# Patient Record
Sex: FEMALE | Race: WHITE | NOT HISPANIC OR LATINO | Employment: STUDENT | URBAN - METROPOLITAN AREA
[De-identification: names, ages, dates, MRNs, and addresses within clinical notes are randomized per-mention and may not be internally consistent; named-entity substitution may affect disease eponyms.]

---
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
## 2023-10-13 NOTE — PROGRESS NOTES
New Knee      Patient: Albertina Sinclair        YOB: 2005    Medical Record Number: [id number]        Chief Complaints: Left knee pain      History of Present Illness: This is a 18-year-old female who is status post ACL reconstruction in 2021 she did have a partial medial lateral meniscectomy she states she had some periodic pain since that time but 2 weeks ago she was kneeling down her knee popped and she has had pain since that time.  She has been seeing physical therapy on and off for several months she currently working at [employer].  She is scheduled to run a half marathon in the next 3 weeks but I do not think she is going to be able to do that.  Past medical history is unremarkable        Allergies: No Known Allergies    Medications:   Home Medications:  Current Outpatient Medications on File Prior to Visit   Medication Sig    doxycycline (ADOXA) 100 MG tablet Take 1 tablet by mouth.    Erenumab-aooe (AIMOVIG) 70 MG/ML auto-injector Inject 1 mL under the skin into the appropriate area as directed.    metroNIDAZOLE (FLAGYL) 500 MG tablet Take 1 tablet by mouth.    naproxen (NAPROSYN) 500 MG tablet Last took one week ago    SUMAtriptan (IMITREX) 25 MG tablet Take 1 tablet by mouth.    topiramate (TOPAMAX) 50 MG tablet Take 50 mg by mouth Daily. (Patient not taking: Reported on 10/16/2023)     No current facility-administered medications on file prior to visit.     Current Medications:  Scheduled Meds:  Continuous Infusions:No current facility-administered medications for this visit.    PRN Meds:.    Past Medical History:   Diagnosis Date    Anxiety     Body piercing     new nose piercing.. can't remove     COVID-19 virus infection 2020    Left knee injury     playing soccer     Migraines         Past Surgical History:   Procedure Laterality Date    KNEE ACL RECONSTRUCTION Left 12/21/2021    Procedure: LEFT KNEE ARTHROSCOPY, PARTIAL MEDIAL AND LATERAL MENISCECTOMY, ANTERIOR CRUCIATE LIGAMENT RECONSTRUCTION  "WITH AUTOGRAFT  w mcq  12/21;  Surgeon: Neelam Ying MD;  [facility] [facility] [place] [facility] [place];  Service: Orthopedics;  Laterality: Left;    LEG SURGERY      age 3        Social History     Occupational History    Not on file   Tobacco Use    Smoking status: Never    Smokeless tobacco: Never   Vaping Use    Vaping Use: Never used   Substance and Sexual Activity    Alcohol use: Never    Drug use: Never    Sexual activity: Defer      Social History     Social History Narrative    Not on file        Family History   Problem Relation Age of Onset    No Known Problems Mother     Hypertension Father     Malig Hyperthermia Neg Hx              Review of Systems:     Review of Systems      Physical Exam: 18 y.o. female  General Appearance:    Alert, cooperative, in no acute distress                   Vitals:    10/16/23 1422   Temp: 96.8 °F (36 °C)   Weight: 72.3 kg (159 lb 4.8 oz)   Height: 149.9 cm (59\")   PainSc:   3      Patient is alert and read ×3 no acute distress appears her above-listed at height weight and age.  Affect is normal respiratory rate is normal unlabored. Heart rate regular rate rhythm, sclera, dentition and hearing are normal for the purpose of this exam.        Ortho Exam  Exam the left knee she has a healed surgical incision she has a stable diminished exam quads are reasonable she does have a BMI of 32 which puts her in a level of class I obesity  She appears to have a stable ligamentous exam and negative drawer negative Lachman negative pivot shift she is does have some tenderness medially she has pain with bounce home some pain with medial Lashell no pain with lateral Lashell  Procedures             Radiology:   AP, Lateral and merchant views of the left knee  were ordered/reviewed to evauateknee pain.  I have compared to films in 2021 she does have evidence of prior ACL reconstruction she has good maintenance of her joint space  Imaging Results (Most Recent)       Procedure Component Value Units " Date/Time    XR Knee 3 View Left [[id number]] Resulted: 10/16/23 1416     Updated: 10/16/23 1416    Impression:      Ordering physician's impression is located in the Encounter Note dated 10/16/23. X-ray performed in the DR room.            Assessment/Plan:      Left knee pain status post ACL reconstruction.  I be concerned about meniscus tear.  In looking at her previous pictures she did have some fissuring of her lateral femoral condyle that might be bothering her as well thought about an injection but in a 18-year-old I would like to go on to get an MRI before considering that.  She has been doing physical therapy anti-inflammatories ice and rest

## 2023-10-16 ENCOUNTER — OFFICE VISIT (OUTPATIENT)
Dept: ORTHOPEDIC SURGERY | Facility: CLINIC | Age: 18
End: 2023-10-16
Payer: COMMERCIAL

## 2023-10-16 VITALS — BODY MASS INDEX: 32.12 KG/M2 | TEMPERATURE: 96.8 F | HEIGHT: 59 IN | WEIGHT: 159.3 LBS

## 2023-10-16 DIAGNOSIS — R52 PAIN: Primary | ICD-10-CM

## 2023-10-16 DIAGNOSIS — S83.242A TEAR OF MEDIAL MENISCUS OF LEFT KNEE, CURRENT, UNSPECIFIED TEAR TYPE, INITIAL ENCOUNTER: ICD-10-CM

## 2023-10-16 DIAGNOSIS — Z98.890 S/P ACL RECONSTRUCTION: ICD-10-CM

## 2023-10-16 RX ORDER — DOXYCYCLINE 100 MG/1
100 TABLET ORAL
COMMUNITY
Start: 2023-10-16 | End: 2023-10-30

## 2023-10-16 RX ORDER — SUMATRIPTAN 25 MG/1
25 TABLET, FILM COATED ORAL
COMMUNITY
Start: 2023-08-31

## 2023-10-16 RX ORDER — METRONIDAZOLE 500 MG/1
500 TABLET ORAL
COMMUNITY
Start: 2023-10-16 | End: 2023-10-30

## 2023-11-09 ENCOUNTER — HOSPITAL ENCOUNTER (OUTPATIENT)
Dept: MRI IMAGING | Facility: HOSPITAL | Age: 18
Discharge: HOME | End: 2023-11-09
Admitting: ORTHOPAEDIC SURGERY
Payer: COMMERCIAL

## 2023-11-09 DIAGNOSIS — S83.242A TEAR OF MEDIAL MENISCUS OF LEFT KNEE, CURRENT, UNSPECIFIED TEAR TYPE, INITIAL ENCOUNTER: ICD-10-CM

## 2023-11-09 DIAGNOSIS — Z98.890 S/P ACL RECONSTRUCTION: ICD-10-CM

## 2023-11-09 PROCEDURE — 73721 MRI JNT OF LWR EXTRE W/O DYE: CPT

## 2023-11-10 ENCOUNTER — TELEPHONE (OUTPATIENT)
Dept: ORTHOPEDIC SURGERY | Facility: CLINIC | Age: 18
End: 2023-11-10
Payer: COMMERCIAL

## 2023-11-10 NOTE — TELEPHONE ENCOUNTER
----- Message from Neelam Ying MD sent at 11/10/2023 12:28 PM EST -----  Please tell her that she does have a medial meniscus tear that is appears to extend to the surface now her graft looks to be intact if her knee is still bothering her I would recommend scoping this meniscus possible trimming part of it out possible repair I would like her to follow-up for further discussion

## 2023-11-13 ENCOUNTER — OFFICE VISIT (OUTPATIENT)
Dept: ORTHOPEDIC SURGERY | Facility: CLINIC | Age: 18
End: 2023-11-13
Payer: COMMERCIAL

## 2023-11-13 VITALS — WEIGHT: 159.7 LBS | BODY MASS INDEX: 32.2 KG/M2 | HEIGHT: 59 IN | TEMPERATURE: 98 F

## 2023-11-13 DIAGNOSIS — S83.242A OTHER TEAR OF MEDIAL MENISCUS OF LEFT KNEE AS CURRENT INJURY, INITIAL ENCOUNTER: Primary | ICD-10-CM

## 2023-11-13 NOTE — PROGRESS NOTES
Patient: Albertina Sinclair  YOB: 2005  Date of Service: 11/13/2023    Chief Complaints: Left knee pain    Subjective:    History of Present Illness: Pt is seen in the office today with complaints of left knee pain she is here follow-up MRI.  MRI demonstrates a medial meniscus tear.  When she had her ACL done a couple years ago she did have some changes in that meniscus that intraoperatively appeared within the meniscus that did not extend to either side surface.  It does appear to extend to the inferior surface today and that is the only change the graft is intact she states her knee is feeling better        Allergies: No Known Allergies    Medications:   Home Medications:  Current Outpatient Medications on File Prior to Visit   Medication Sig    Erenumab-aooe (AIMOVIG) 70 MG/ML auto-injector Inject 1 mL under the skin into the appropriate area as directed.    naproxen (NAPROSYN) 500 MG tablet Last took one week ago    SUMAtriptan (IMITREX) 25 MG tablet Take 1 tablet by mouth. (Patient not taking: Reported on 11/13/2023)    topiramate (TOPAMAX) 50 MG tablet Take 50 mg by mouth Daily. (Patient not taking: Reported on 10/16/2023)     No current facility-administered medications on file prior to visit.     Current Medications:  Scheduled Meds:  Continuous Infusions:No current facility-administered medications for this visit.    PRN Meds:.    I have reviewed the patient's medical history in detail and updated the computerized patient record.  Review and summarization of old records include:    Past Medical History:   Diagnosis Date    Anxiety     Body piercing     new nose piercing.. can't remove     COVID-19 virus infection 2020    Left knee injury     playing soccer     Migraines         Past Surgical History:   Procedure Laterality Date    KNEE ACL RECONSTRUCTION Left 12/21/2021    Procedure: LEFT KNEE ARTHROSCOPY, PARTIAL MEDIAL AND LATERAL MENISCECTOMY, ANTERIOR CRUCIATE LIGAMENT RECONSTRUCTION WITH  AUTOGRAFT  w mcq  12/21;  Surgeon: Neelam Ying MD;  Location: [facility] [place];  Service: Orthopedics;  Laterality: Left;    LEG SURGERY      age 3        Social History     Occupational History    Not on file   Tobacco Use    Smoking status: Never    Smokeless tobacco: Never   Vaping Use    Vaping Use: Never used   Substance and Sexual Activity    Alcohol use: Never    Drug use: Never    Sexual activity: Defer      Social History     Social History Narrative    Not on file        Family History   Problem Relation Age of Onset    No Known Problems Mother     Hypertension Father     Malig Hyperthermia Neg Hx        ROS: 14 point review of systems was performed and was negative except for documented findings in HPI and today's encounter.     Allergies: No Known Allergies  Constitutional:  Denies fever, shaking or chills   Eyes:  Denies change in visual acuity   HENT:  Denies nasal congestion or sore throat   Respiratory:  Denies cough or shortness of breath   Cardiovascular:  Denies chest pain or severe LE edema   GI:  Denies abdominal pain, nausea, vomiting, bloody stools or diarrhea   Musculoskeletal:  Numbness, tingling, or loss of motor function only as noted above in history of present illness.  : Denies painful urination or hematuria  Integument:  Denies rash, lesion or ulceration   Neurologic:  Denies headache or focal weakness  Endocrine:  Denies lymphadenopathy  Psych:  Denies confusion or change in mental status   Hem:  Denies active bleeding      Physical Exam: 18 y.o. female  Wt Readings from Last 3 Encounters:   11/13/23 72.4 kg (159 lb 11.2 oz)   10/16/23 72.3 kg (159 lb 4.8 oz)   08/11/22 67.1 kg (148 lb) (80%, Z= 0.84)*     * Growth percentiles are based on CDC (Girls, 2-20 Years) data.       Body mass index is 32.26 kg/m².    Vitals:    11/13/23 1655   Temp: 98 °F (36.7 °C)     Vital signs reviewed.   General Appearance:    Alert, cooperative, in no acute distress                    Ortho  exam      Physical exam of the left knee reveals no effusion no redness.  The patient does have tenderness about the medial joint line.  No tenderness about the lateral joint line.  A negative bounce home and a positive medial aLshell.  There is some pain medially  with a lateral Lashell.  Patient has a stable ligamentous exam.  Quads are reasonable and symmetric bilaterally.  Calf is soft and nontender.  There is no overlying skin changes no lymphedema lymphadenopathy.  Patient has good hip range of motion full symmetric and asymptomatic and a normal ankle exam.        MRIs as above have reviewed the films with self and agree with the findings    Assessment: Left knee medial meniscus tear.  It does appear to extend a little more to the surface at her last MRI I had a long discussion with her regarding this.  If her knee continues to bother her would recommend arthroscopy at the time of scope if this is repairable would put sutures in it if not we will do a partial meniscectomy.  She understands the difference in weightbearing and postop regimen she is asking about running etc.  I told her I really limit things like that I would really want her to try to get back in shape before she attempts to to do the same she does have a BMI of 32 however height is listed at 5 9 I would like to get an accurate height and weight when she returns  Plan:   Follow up as indicated.  Ice, elevate, and rest as needed.  Discussed conservative measures of pain control including ice, bracing.  Also talked about the importance of strengthening and maintaining ideal body weight    Neelam Ying M.D.

## 2023-11-19 NOTE — PATIENT INSTRUCTIONS
Access Code: [email or web address]  URL: [email or web address]  Date: 12/23/2021  Prepared by: Jeny Marin    Exercises  Long Sitting Quad Set - 3 x daily - 7 x weekly - 1 sets - 20 reps - 5s hold  Seated Table Hamstring Stretch - 3 x daily - 7 x weekly - 1 sets - 3 reps - 20s hold  Sitting Heel Slide with Towel - 3 x daily - 7 x weekly - 3 sets - 10 reps  Seated Heel Slide - 3 x daily - 7 x weekly - 3 sets - 10 reps  Supine Ankle Pumps - 3 x daily - 7 x weekly - 1 sets - 30 reps     50

## 2024-05-22 ENCOUNTER — TELEPHONE (OUTPATIENT)
Dept: ORTHOPEDIC SURGERY | Facility: CLINIC | Age: 19
End: 2024-05-22
Payer: COMMERCIAL

## 2024-05-22 NOTE — TELEPHONE ENCOUNTER
Lets offer her to see Cristhian Thursday or Friday so she can get in sooner she most likely needs a repeat MRI I last saw her in November

## 2024-05-22 NOTE — PROGRESS NOTES
[facility]  New Problem      Patient Name: Albertina Sinclair  : 2005  Primary Care Physician: Fausto Moore MD        Chief Complaint:  Left knee pain    HPI:   Albertina Sinclair is a 18 y.o. year old who presents today for evaluation. Patient has a history of Acl reconstruction with Dr. Ying 2021. She did well post-operatively but had recurrent knee pain about 7 months ago. MRI was obtained which was concerning for worsening medial meniscus tear- posterior horn which extended more to the surface. She was improving so non-op treatment was recommended as long as meniscus didn't get worse. She did well until a new recent event about 3 weeks ago.    She was sitting on the ground, cross legged. When she did so she experienced a painful pop, her knee swelled up. Since then has been swollen and hard to bend or straighten. Limping when she is walking. It is locking and catching, she has to sometimes forcefully maneuver the knee to get it to unlock.    Pain right in the middle, behind knee cap, some anterior lateral pain as well. Has some night pain with turning in bed etc.     She has been using advil, ice, resting and bracing all without improvement in her symptoms and she is pretty miserable as far as her pain.      Past Medical/Surgical, Social and Family History:  I have reviewed and/or updated pertinent history as noted in the medical record including:  Past Medical History:   Diagnosis Date    Anxiety     Body piercing     new nose piercing.. can't remove     COVID-19 virus infection     Left knee injury     playing soccer     Migraines     Tear of meniscus of knee      Past Surgical History:   Procedure Laterality Date    KNEE ACL RECONSTRUCTION Left 2021    Procedure: LEFT KNEE ARTHROSCOPY, PARTIAL MEDIAL AND LATERAL MENISCECTOMY, ANTERIOR CRUCIATE LIGAMENT RECONSTRUCTION WITH AUTOGRAFT  w [facility] [place]  ;  Surgeon: Neelam Ying MD;  Location: [facility] OR [facility] [place];  Service:  Orthopedics;  Laterality: Left;    LEG SURGERY      age 3     Social History     Occupational History    Not on file   Tobacco Use    Smoking status: Never    Smokeless tobacco: Never   Vaping Use    Vaping status: Never Used   Substance and Sexual Activity    Alcohol use: Never    Drug use: Never    Sexual activity: Not Currently     Partners: Male     Birth control/protection: Condom          Allergies: No Known Allergies    Medications:   Home Medications:  Current Outpatient Medications on File Prior to Visit   Medication Sig    Erenumab-aooe (AIMOVIG) 70 MG/ML auto-injector Inject 1 mL under the skin into the appropriate area as directed.    SUMAtriptan (IMITREX) 25 MG tablet Take 1 tablet by mouth.    [DISCONTINUED] naproxen (NAPROSYN) 500 MG tablet Last took one week ago    [DISCONTINUED] topiramate (TOPAMAX) 50 MG tablet Take 50 mg by mouth Daily. (Patient not taking: Reported on 10/16/2023)     No current facility-administered medications on file prior to visit.         ROS:  14 point review of systems was negative except as listed in the HPI.    Physical Exam:   18 y.o. female  Body mass index is 29.33 kg/m²., 65.9 kg (145 lb 3.2 oz)  Vitals:    05/23/24 0935   Temp: 98.2 °F (36.8 °C)     General: Alert, cooperative, appears well and in no observable distress. Appears stated age and BMI as listed above.  HEENT: Normocephalic, atraumatic on external visual inspection.  CV: No significant peripheral edema.  Respiratory: Normal respiratory effort.  Skin: Warm & well perfused; appropriate skin turgor.  Psych: Appropriate mood & affect.  Neuro: Gross sensation and motor intact in affected extremity/extremities.  Vascular: Peripheral pulses palpable in affected extremity/extremities.     MSK Exam:    Knee Exam:    Left   No deformity or wounds appreciated. No significant redness or warmth.  1+ effusion noted.  Tenderness along the joint line appreciated medially.  ROM 5-120 with pain at terminal  motion.  Ligamentous exam grossly stable.  Lashell + medial  Bounce Home +  Lachman painful but appears stable.  Negative anterior and posterior drawer  Stable to varus/valgus  Quad strength 4 to 4+/5    Right   No deformity or wounds appreciated. No significant redness or warmth.  No significant effusion noted.  No significant tenderness appreciated about the joint.  ROM 0-130 and painless.  Ligamentous exam grossly stable.  Quad strength 4+ to 5/5.    Brief hip exam in the affected extremity(ies) grossly unremarkable.  Moves ankle and toes up and down, no significant pain or swelling in the foot, ankle or calf.       Radiology:    The following X-rays were ordered/reviewed today to evaluate the patient's symptoms: Single Knee: AP standing and sunrise views of both knees, and lateral view of painful knee show stable implements from prior ACL reconstruction- in expected position. Her joint spaces are well preserved medial and lateral compartments as well as the patellofemoral joint space. No obvious lesions, no OCD noted. Stable when compared with prior films.    Procedure:   N/A    Misc. Data/Labs: N/A    Assessment & Plan:    ICD-10-CM ICD-9-CM   1. Other tear of medial meniscus of left knee as current injury, initial encounter  S83.242A 836.0   2. History of repair of ACL  Z98.890 V45.89     No orders of the defined types were placed in this encounter.    Orders Placed This Encounter   Procedures    XR Knee 3 View Left    MRI Knee Left Without Contrast     This is a 17 y/o female with a new left knee injury. She has known meniscal pathology which she and Dr. Ying have been closely following. She was doing well until recently when she had a painful pop in her left knee while sitting in deep flexion and she has been in pain since despite conservative care. Concern is that there has been further derangement of her meniscus and she will need to consider arthroscopy. Plan is to proceed with an MRI to better  evaluate for surgical planning. Certainly she cannot live with the mechanical symptoms- locking/catching etc.     Recommend she continue with conservative care, brace, ice etc in the interim. She will see Dr. Ying next week, we will try to get this MRI done within the next few days.     Return for follow up after the MRI.    Patient encouraged to call with questions or concerns prior to follow up.  Recommend ICE and/or HEAT PRN as discussed.  Will discuss with attending physician as needed.  Consider additional referrals, work up and/or advanced imaging as indicated or if patient fails to respond to conservative care.        Cristhian Pereira, APRN

## 2024-05-22 NOTE — TELEPHONE ENCOUNTER
Caller: DAT BYRNES    Relationship to patient: Mother    Patient is needing: PATIENTS MOTHER CALLED STATING THE PATIENT RECENTLY HEARD A POP IN HER LEFT KNEE AND IS NOW EXPERIENCING SWELLING AND IS HAVING A HARD TIME WALKING.   PATIENTS MOTHER WANTED TO GET THE PATIENT SEEN SOONER THAN 5/31/24, BUT THERE WAS NOTHING SOONER THAN 5/30/24.     PATIENT WORKS DURING OUR BUSINESS HOURS, PLEASE MESSAGE PATIENT VIA [employer] TO DISCUSS WHAT TO DO FOR THE LEFT KNEE PAIN.     PATIENTS MOTHER IS NOT ON A VERBAL RELEASE

## 2024-05-23 ENCOUNTER — OFFICE VISIT (OUTPATIENT)
Dept: ORTHOPEDIC SURGERY | Facility: CLINIC | Age: 19
End: 2024-05-23
Payer: COMMERCIAL

## 2024-05-23 VITALS — HEIGHT: 59 IN | WEIGHT: 145.2 LBS | TEMPERATURE: 98.2 F | BODY MASS INDEX: 29.27 KG/M2

## 2024-05-23 DIAGNOSIS — S83.242A OTHER TEAR OF MEDIAL MENISCUS OF LEFT KNEE AS CURRENT INJURY, INITIAL ENCOUNTER: Primary | ICD-10-CM

## 2024-05-23 DIAGNOSIS — Z98.890 HISTORY OF REPAIR OF ACL: ICD-10-CM

## 2024-05-24 ENCOUNTER — TELEPHONE (OUTPATIENT)
Dept: ORTHOPEDIC SURGERY | Facility: CLINIC | Age: 19
End: 2024-05-24

## 2024-05-24 NOTE — TELEPHONE ENCOUNTER
Caller: Albertina Sinclair     Relationship: Self     Best call back number: [phone]     What was the call regarding: PT STATES THAT [facility] HAS NOT RECEIVED MRI REFERRAL YET. PT STATES THAT SOMEONE LET HER KNOW THAT THE ORDER WAS FAXED TO [facility] ON 05/23/24. PLEASE CONTACT PT TO CONFIRM THAT THE MRI WAS FAXED. PT IS CONCERNED THAT IF SHE DOES NOT HAVE HER MRI SOON, SHE WILL NEED TO RESCHEDULE HER FOLLOW UP ON 05/31/24.

## 2024-05-28 ENCOUNTER — TELEPHONE (OUTPATIENT)
Dept: ORTHOPEDIC SURGERY | Facility: CLINIC | Age: 19
End: 2024-05-28

## 2024-05-28 NOTE — TELEPHONE ENCOUNTER
Caller: Albertina Sinclair    Relationship: Self    Best call back number: [phone]    Who are you requesting to speak with (clinical staff, provider,  specific staff member): MARK HERNANDEZ    What was the call regarding: PT STATES THAT MARK TOLD HER THAT HER MRI REFERRAL WOULD BE MARKED AS URGENT, BUT IT WAS NOT. SINCE THE REFERRAL WAS NOT MARKED AS URGENT, [facility] STATES THEY CANNOT GET THE PT IN FOR HER MRI THIS WEEK. PT CURRENTLY HAS AN APPT WITH DR LA IN REGARDS TO HER L KNEE ON 05/31/24. PT CURRENTLY HAS MRI SCHEDULED FOR 06/06/24. PT WOULD BE OK WITH RESCHEDULING MRI FOR THIS WEEK IF POSSIBLE. PLEASE CONTACT PT IF APPT WITH DR LA ON 05/31/24 NEEDS TO BE RESCHEDULED FOR AFTER HER MRI OR IF MRI ORDER CAN BE MARKED URGENT FOR HER TO GET HER MRI DONE SOONER.    Anticipated Discharge Disposition: TBD     Action: LSW made tc to pt's son, Dr. Lopze Shaikh ([phone]). This number has been disconnected.      LSW made tc to ([phone]) and this worker was provided a number to [facility] ([phone]) this # is for billing only. This worker was provided with [facility]'s office # at [facility] ([phone]) and [facility]'s office # at [facility] ([phone]).     LSW made a tc to [facility]'s office # at [facility] ([phone]) and  for a return call.     LSW made a tc to [facility]'s office # at [facility] ([phone]) and  for a return call.      LSW located ([phone]) in pt's chart for sonLopez. LSW called the # and was unable to leave a VM.     LSW made tc to ([phone]), another # located in pt's chart for sonLopez, the number has been disconnected.      LSW made tc to ([phone]), number located in pt's chart for daughter, Senia Shaikh. No answer, just rang non-stop.     LSW made tc to ([phone]) [facility] search listed for pt's dtr, Senia. The man who answered stated it was the wrong number.      Barriers to Discharge: Safe dc Plan; No Decision Maker     Plan: LSW to f/u with management.     Lopez Shaikh - Son  Doctor with [facility] in [place]        Alondra Hawayfn Shoemaker - Daughter  [phone]  [email or web address]  [address] [place] [address]      Hesham Shoemaker - 2nd degree   [phone]  [email or web address]  [address] [place] [address]

## 2024-05-28 NOTE — TELEPHONE ENCOUNTER
I did ask that they try to get it done within the week although not technically a STAT. It may be partly due to delay with insurance approval. We can try to move it up sooner, but 6/6/24 is probably not unreasonable if she could wait. If [facility] is willing able to move it up that would be great. If not, we might just move her appointment with Dr. Ying out a day or two after the MRI.

## 2024-05-29 ENCOUNTER — TELEPHONE (OUTPATIENT)
Dept: ORTHOPEDIC SURGERY | Facility: CLINIC | Age: 19
End: 2024-05-29

## 2024-05-29 NOTE — TELEPHONE ENCOUNTER
Caller: Albertina Sinclair    Relationship to patient: Self    Best call back number: [phone] (home)     Patient is needing: PATIENT WANTS TO KNOW IF HER SHE CAN DO HER 5/31/24 VISIT AS TELEHEALTH

## 2024-05-30 NOTE — TELEPHONE ENCOUNTER
Do we have the MRI report if it is not an operative discussion then we can do a telehealth if it is an operative discussion then I like to do those face-to-face.  I do not see the MRI report in the chart

## 2024-05-31 ENCOUNTER — OFFICE VISIT (OUTPATIENT)
Dept: ORTHOPEDIC SURGERY | Facility: CLINIC | Age: 19
End: 2024-05-31
Payer: COMMERCIAL

## 2024-05-31 VITALS — BODY MASS INDEX: 29.23 KG/M2 | HEIGHT: 59 IN | TEMPERATURE: 98.4 F | WEIGHT: 145 LBS

## 2024-05-31 DIAGNOSIS — S83.242D OTHER TEAR OF MEDIAL MENISCUS OF LEFT KNEE AS CURRENT INJURY, SUBSEQUENT ENCOUNTER: Primary | ICD-10-CM

## 2024-05-31 NOTE — PROGRESS NOTES
I spoke to patient on the phone to give her the results of the MRI.  She has missed a lot of work recently and asked if we would do it over the phone.  She has been through knee surgery before as we did her ACL.  Her MRI shows a medial meniscus tear as well as an intact ACL graft.  At some point we need need to scope this if her symptoms persist I think she is done everything conservative she probably needs to wait till the end of the summer per the patient so her rotations are over.  We did discuss perioperative regimen we discussed risk benefits and alternatives she understands these again she has been through the surgery and the

## 2024-05-31 NOTE — PROGRESS NOTES
Patient: Albertina Sinclair  YOB: 2005  Date of Service: 5/31/2024    Chief Complaints:     Subjective:    History of Present Illness: Pt is seen in the office today with complaints of         Allergies: No Known Allergies    Medications:   Home Medications:  Current Outpatient Medications on File Prior to Visit   Medication Sig    Erenumab-aooe (AIMOVIG) 70 MG/ML auto-injector Inject 1 mL under the skin into the appropriate area as directed.    SUMAtriptan (IMITREX) 25 MG tablet Take 1 tablet by mouth.     No current facility-administered medications on file prior to visit.     Current Medications:  Scheduled Meds:  Continuous Infusions:No current facility-administered medications for this visit.    PRN Meds:.    I have reviewed the patient's medical history in detail and updated the computerized patient record.  Review and summarization of old records include:    Past Medical History:   Diagnosis Date    Anxiety     Body piercing     new nose piercing.. can't remove     COVID-19 virus infection 2020    Left knee injury     playing soccer     Migraines     Tear of meniscus of knee         Past Surgical History:   Procedure Laterality Date    KNEE ACL RECONSTRUCTION Left 12/21/2021    Procedure: LEFT KNEE ARTHROSCOPY, PARTIAL MEDIAL AND LATERAL MENISCECTOMY, ANTERIOR CRUCIATE LIGAMENT RECONSTRUCTION WITH AUTOGRAFT  w q  12/21;  Surgeon: Neelam Ying MD;  [place] [facility] [place] [facility] [place]: [place]opedics;  Laterality: Left;    LEG SURGERY      age 3        Social History     Occupational History    Not on file   Tobacco Use    Smoking status: Never    Smokeless tobacco: Never   Vaping Use    Vaping status: Never Used   Substance and Sexual Activity    Alcohol use: Never    Drug use: Never    Sexual activity: Not Currently     Partners: Male     Birth control/protection: Condom      Social History     Social History Narrative    Not on file        Family History   Problem Relation Age of Onset    No  Known Problems Mother     Hypertension Father     Malig Hyperthermia Neg Hx        ROS: 14 point review of systems was performed and was negative except for documented findings in HPI and today's encounter.     Allergies: No Known Allergies  Constitutional:  Denies fever, shaking or chills   Eyes:  Denies change in visual acuity   HENT:  Denies nasal congestion or sore throat   Respiratory:  Denies cough or shortness of breath   Cardiovascular:  Denies chest pain or severe LE edema   GI:  Denies abdominal pain, nausea, vomiting, bloody stools or diarrhea   Musculoskeletal:  Numbness, tingling, or loss of motor function only as noted above in history of present illness.  : Denies painful urination or hematuria  Integument:  Denies rash, lesion or ulceration   Neurologic:  Denies headache or focal weakness  Endocrine:  Denies lymphadenopathy  Psych:  Denies confusion or change in mental status   Hem:  Denies active bleeding      Physical Exam: 18 y.o. female  Wt Readings from Last 3 Encounters:   05/23/24 65.9 kg (145 lb 3.2 oz)   11/13/23 72.4 kg (159 lb 11.2 oz)   10/16/23 72.3 kg (159 lb 4.8 oz)     *** HELP TEXT ***    This SmartLink requires parameters. Parameters are variables that are added to the SmartLink name to request specific information. The parameter for .lastht is the number of encounters to display readings from.    For example: .lastht[4    In this example, the SmartLink displays readings from the last four encounters.    There is no height or weight on file to calculate BMI.    There were no vitals filed for this visit.  Vital signs reviewed.   General Appearance:    Alert, cooperative, in no acute distress                    Ortho exam                 Assessment:     Plan:   Follow up as indicated.  Ice, elevate, and rest as needed.  Discussed conservative measures of pain control including ice, bracing.  Also talked about the importance of strengthening and maintaining ideal body  clive Ying M.D.

## 2024-07-24 ENCOUNTER — PREP FOR SURGERY (OUTPATIENT)
Dept: OTHER | Facility: HOSPITAL | Age: 19
End: 2024-07-24
Payer: COMMERCIAL

## 2024-07-24 DIAGNOSIS — S83.242D OTHER TEAR OF MEDIAL MENISCUS OF LEFT KNEE AS CURRENT INJURY, SUBSEQUENT ENCOUNTER: Primary | ICD-10-CM

## 2024-07-25 PROBLEM — S83.242A TEAR OF MEDIAL MENISCUS OF LEFT KNEE, CURRENT: Status: ACTIVE | Noted: 2024-07-24

## 2024-08-12 ENCOUNTER — TELEPHONE (OUTPATIENT)
Dept: ORTHOPEDIC SURGERY | Facility: CLINIC | Age: 19
End: 2024-08-12

## 2024-08-12 NOTE — TELEPHONE ENCOUNTER
Caller: PANCHO FITZ    Phone Number: [phone] (home)     Reason for Call:   PATIENT CALLED IN NEEDING TO KNOW MORE INFORMATION ABOUT SURGERY . WHEN TO BE THERE ANY PREP THINGS SHE NEEDS TO DO ETCETERA.

## 2024-08-13 NOTE — TELEPHONE ENCOUNTER
I spoke to patient this morning. She was contacted by [facility] yesterday and found out surgery may not be covered by insurance now and may need to wait for approx three weeks. Patient will call back today and let me know of final decision.

## 2024-10-20 ENCOUNTER — PATIENT MESSAGE (OUTPATIENT)
Dept: ORTHOPEDIC SURGERY | Facility: CLINIC | Age: 19
End: 2024-10-20
Payer: COMMERCIAL

## 2024-10-20 NOTE — LETTER
October 21, 2024     Patient: Albertina Sinclair   YOB: 2005   Date of Visit: 10/20/2024       To Whom It May Concern:    It is my medical opinion that Albertina Sinclair will need to do remote learning for at least 2 weeks after her knee surgery to allow her to recover.           Sincerely,        Neelam Ying MD    CC: No Recipients

## 2024-10-21 ENCOUNTER — OFFICE VISIT (OUTPATIENT)
Dept: FAMILY MEDICINE CLINIC | Facility: CLINIC | Age: 19
End: 2024-10-21
Payer: COMMERCIAL

## 2024-10-21 ENCOUNTER — LAB (OUTPATIENT)
Dept: LAB | Facility: HOSPITAL | Age: 19
End: 2024-10-21
Payer: COMMERCIAL

## 2024-10-21 VITALS
DIASTOLIC BLOOD PRESSURE: 78 MMHG | WEIGHT: 148 LBS | BODY MASS INDEX: 29.84 KG/M2 | SYSTOLIC BLOOD PRESSURE: 116 MMHG | OXYGEN SATURATION: 99 % | HEIGHT: 59 IN | HEART RATE: 90 BPM

## 2024-10-21 DIAGNOSIS — F41.9 ANXIETY: ICD-10-CM

## 2024-10-21 DIAGNOSIS — Z00.00 HEALTHCARE MAINTENANCE: ICD-10-CM

## 2024-10-21 DIAGNOSIS — Z11.59 ENCOUNTER FOR HEPATITIS C SCREENING TEST FOR LOW RISK PATIENT: ICD-10-CM

## 2024-10-21 DIAGNOSIS — L70.9 ACNE, UNSPECIFIED ACNE TYPE: ICD-10-CM

## 2024-10-21 DIAGNOSIS — Z11.4 SCREENING FOR HIV WITHOUT PRESENCE OF RISK FACTORS: ICD-10-CM

## 2024-10-21 DIAGNOSIS — L65.9 HAIR LOSS: ICD-10-CM

## 2024-10-21 DIAGNOSIS — Z00.00 HEALTHCARE MAINTENANCE: Primary | ICD-10-CM

## 2024-10-21 DIAGNOSIS — F81.0: ICD-10-CM

## 2024-10-21 DIAGNOSIS — Z23 NEED FOR TDAP VACCINATION: ICD-10-CM

## 2024-10-21 LAB
BASOPHILS # BLD AUTO: 0.03 10*3/MM3 (ref 0–0.2)
BASOPHILS NFR BLD AUTO: 0.4 % (ref 0–1.5)
CHOLEST SERPL-MCNC: 169 MG/DL (ref 0–200)
DEPRECATED RDW RBC AUTO: 40.1 FL (ref 37–54)
EOSINOPHIL # BLD AUTO: 0.13 10*3/MM3 (ref 0–0.4)
EOSINOPHIL NFR BLD AUTO: 1.8 % (ref 0.3–6.2)
ERYTHROCYTE [DISTWIDTH] IN BLOOD BY AUTOMATED COUNT: 11.8 % (ref 12.3–15.4)
HBA1C MFR BLD: 5.1 % (ref 4.8–5.6)
HCT VFR BLD AUTO: 41.3 % (ref 34–46.6)
HCV AB SER QL: NORMAL
HDLC SERPL QL: 3.52
HDLC SERPL-MCNC: 48 MG/DL (ref 40–60)
HGB BLD-MCNC: 14 G/DL (ref 12–15.9)
HIV 1+2 AB+HIV1 P24 AG SERPL QL IA: NORMAL
IMM GRANULOCYTES # BLD AUTO: 0.02 10*3/MM3 (ref 0–0.05)
IMM GRANULOCYTES NFR BLD AUTO: 0.3 % (ref 0–0.5)
LDLC SERPL CALC-MCNC: 111 MG/DL (ref 0–100)
LYMPHOCYTES # BLD AUTO: 2.33 10*3/MM3 (ref 0.7–3.1)
LYMPHOCYTES NFR BLD AUTO: 32.7 % (ref 19.6–45.3)
MCH RBC QN AUTO: 31.3 PG (ref 26.6–33)
MCHC RBC AUTO-ENTMCNC: 33.9 G/DL (ref 31.5–35.7)
MCV RBC AUTO: 92.4 FL (ref 79–97)
MONOCYTES # BLD AUTO: 0.53 10*3/MM3 (ref 0.1–0.9)
MONOCYTES NFR BLD AUTO: 7.4 % (ref 5–12)
NEUTROPHILS NFR BLD AUTO: 4.08 10*3/MM3 (ref 1.7–7)
NEUTROPHILS NFR BLD AUTO: 57.4 % (ref 42.7–76)
NRBC BLD AUTO-RTO: 0 /100 WBC (ref 0–0.2)
PLATELET # BLD AUTO: 465 10*3/MM3 (ref 140–450)
PMV BLD AUTO: 9.1 FL (ref 6–12)
RBC # BLD AUTO: 4.47 10*6/MM3 (ref 3.77–5.28)
TRIGL SERPL-MCNC: 52 MG/DL (ref 0–150)
VLDLC SERPL-MCNC: 10 MG/DL (ref 5–40)
WBC NRBC COR # BLD AUTO: 7.12 10*3/MM3 (ref 3.4–10.8)

## 2024-10-21 PROCEDURE — 86803 HEPATITIS C AB TEST: CPT

## 2024-10-21 PROCEDURE — 80061 LIPID PANEL: CPT

## 2024-10-21 PROCEDURE — G0432 EIA HIV-1/HIV-2 SCREEN: HCPCS

## 2024-10-21 PROCEDURE — 90471 IMMUNIZATION ADMIN: CPT

## 2024-10-21 PROCEDURE — 99385 PREV VISIT NEW AGE 18-39: CPT

## 2024-10-21 PROCEDURE — 36415 COLL VENOUS BLD VENIPUNCTURE: CPT

## 2024-10-21 PROCEDURE — 2014F MENTAL STATUS ASSESS: CPT

## 2024-10-21 PROCEDURE — 1159F MED LIST DOCD IN RCRD: CPT

## 2024-10-21 PROCEDURE — 83036 HEMOGLOBIN GLYCOSYLATED A1C: CPT

## 2024-10-21 PROCEDURE — 85025 COMPLETE CBC W/AUTO DIFF WBC: CPT

## 2024-10-21 PROCEDURE — 1160F RVW MEDS BY RX/DR IN RCRD: CPT

## 2024-10-21 PROCEDURE — 80053 COMPREHEN METABOLIC PANEL: CPT

## 2024-10-21 PROCEDURE — 84443 ASSAY THYROID STIM HORMONE: CPT

## 2024-10-21 PROCEDURE — 1126F AMNT PAIN NOTED NONE PRSNT: CPT

## 2024-10-21 PROCEDURE — 90715 TDAP VACCINE 7 YRS/> IM: CPT

## 2024-10-21 NOTE — ASSESSMENT & PLAN NOTE
- Discussed that it could be related to cyclic hormonal changes.   - Encouraged to discuss with gynecology  - Can discuss specific treatment (doxycycline or spironolactone) at next visit if symptoms are still present.

## 2024-10-21 NOTE — ASSESSMENT & PLAN NOTE
- Referral sent to behavioral health regarding formal testing for this.  - Informed patient that once a formal diagnosis is reached, can provide school accomodations.

## 2024-10-21 NOTE — PROGRESS NOTES
"    Female Physical Note      Date: 10/21/2024   Patient Name: Albertina Sinclair  : 2005   MRN: [id number]     Chief Complaint:    Chief Complaint   Patient presents with   • Establish Care     Pt. States she would like to have a referral for Learning Disability.        History of Present Illness: Albertina Sinclair is a 19 y.o. female who is here today for their annual health maintenance and physical.    HPI  Albertina grew up in [place], [place]. 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     Concerns for reading disability-  She states she has always wondered about whether she has had a reading disability. She states she was a good student with good grades, but admits she always struggled with reading and therefore avoided it. She continues to take a \"long time\" to read things. She now studies engineering, and states that she can do math without concern. However, she has recently noticed that she struggles to evaluate large tables with large amounts of data. She has trouble focusing on individual numbers in charts like these, and notices she will mix up numbers. Particularly struggles with 2's and 5's. This recently affected her performance on two tests. Her sister has a learning disability with accomodations at school.     Anxiety- She was diagnosed with this as a child. She recall trails of some medications for this, but did not continue long-term as she did not like the way they made her feel. She felt \"like a zombie.\" She reports this is well-managed now and she goes to therapy regularly.     Acne/hirsutism/hair loss- She was evaluated for these symptoms with pediatrician several years ago with concerns for thyroid dysfunction or PCOS. She states that all testing returned normal at that time. She was on OCP therapy at the time and stopped taking in 2023. She has not seen change in symptoms since initial workup. She adds that now she deals with weight fluctuations. She has regular " menstrual cycles, periods last 5-7 days without accompanying symptoms.       Subjective      Review of Systems:   Review of Systems   Constitutional:  Negative for chills, fatigue and fever.   HENT:  Negative for congestion.    Respiratory:  Negative for shortness of breath.    Cardiovascular:  Negative for chest pain and leg swelling.   Gastrointestinal:  Negative for abdominal pain, constipation, diarrhea and nausea.   Genitourinary:  Negative for difficulty urinating and menstrual problem.   Musculoskeletal:  Negative for arthralgias and back pain.   Skin:  Negative for rash.        Facial acne   Neurological:  Negative for dizziness and headaches.        Past Medical History, Social History, Family History and Care Team were all reviewed with patient and updated as appropriate.     Medications:   No current outpatient medications on file.    Allergies:   No Known Allergies    Immunizations:  Td/Tdap(Booster Q 10 yrs):  7/10/2014- due  Flu (Yearly):  Due- unable to provide in clinic today, recommended to seek at pharmacy.  Pneumonia: UTD  Immunization History   Administered Date(s) Administered   • 31-influenza Vac Quardvalent Preservativ 12/28/2015   • COVID-19 (PFIZER) Purple Cap Monovalent 07/09/2021, 07/30/2021   • DTaP, Unspecified 2005, 2005, 2005, 12/20/2004, 08/20/2007   • HPV Quadrivalent 07/10/2014   • Hep A, Unspecified 08/26/2009, 07/12/2010   • Hep B, Adolescent or Pediatric 2005, 2005, 2003, 12/20/2004   • HiB 2005, 2005, 12/20/2004   • Hpv9 07/23/2019   • MMR 07/13/2004, 08/20/2007   • Meningococcal Conjugate 07/10/2014   • Meningococcal MCV4P (Menactra) 07/23/2019   • PEDS-Pneumococcal Conjugate (PCV7) 2005, 2005, 2003, 10/11/2004   • Pneumococcal, Unspecified 2005, 2005, 2003, 10/11/2004   • Polio, Unspecified 2005, 2003, 03/22/2004, 08/20/2007   • Tdap 07/10/2014, 10/21/2024   • Trumenba(meningococcal  "B) 08/09/2021   • Varicella 07/13/2004, 08/20/2007       Pap:  Has a gynecologist, will complete pap smear with them.   Last Completed Pap Smear       This patient has no relevant Health Maintenance data.           Hep C (Age 18-79 once):  Ordered today   HIV (Age 15-65 once): Ordered today   A1c: Ordered today   Lipid panel: Ordered today   The ASCVD Risk score (Liane CANALES, et al., 2019) failed to calculate for the following reasons:    The 2019 ASCVD risk score is only valid for ages 40 to 79    Ophthalmologist: Has one   Dentist: Has one    Tobacco Use: Low Risk  (10/21/2024)    Patient History    • Smoking Tobacco Use: Never    • Smokeless Tobacco Use: Never    • Passive Exposure: Not on file       Social History     Substance and Sexual Activity   Alcohol Use Never        Social History     Substance and Sexual Activity   Drug Use Never        Diet/Physical activity: Normally states she is physically active, but has been unable to participate in sports and physical activity 2/2 knee injury.     Sexual Health: Uses condoms for contraception, not attempting pregnancy   Menstrual Cycles: regular, lasting 5-7 days, tracking with help of an nakul.     PHQ-2 Depression Screening  Little interest or pleasure in doing things? Not at all   Feeling down, depressed, or hopeless? Not at all   PHQ-2 Total Score 0     PHQ-9 Total Score:      Intimate partner violence: Declines experiencing intimate partner violence.     Objective     Physical Exam:  Vital Signs:   Vitals:    10/21/24 0942   BP: 116/78   Pulse: 90   SpO2: 99%   Weight: 67.1 kg (148 lb)   Height: 149.9 cm (59.02\")   PainSc: 0-No pain     Body mass index is 29.88 kg/m².     Physical Exam  Vitals reviewed.   Constitutional:       General: She is not in acute distress.     Appearance: Normal appearance.   HENT:      Head: Normocephalic and atraumatic.      Right Ear: Tympanic membrane normal.      Left Ear: Tympanic membrane normal.      Nose: Nose normal. No " congestion.      Mouth/Throat:      Mouth: Mucous membranes are moist.      Pharynx: Oropharynx is clear.   Eyes:      Pupils: Pupils are equal, round, and reactive to light.   Cardiovascular:      Rate and Rhythm: Normal rate and regular rhythm.      Pulses: Normal pulses.      Heart sounds: No murmur heard.  Pulmonary:      Effort: Pulmonary effort is normal. No respiratory distress.      Breath sounds: Normal breath sounds.   Abdominal:      General: Abdomen is flat. Bowel sounds are normal.      Palpations: Abdomen is soft.   Musculoskeletal:      Cervical back: Normal range of motion.      Right lower leg: No edema.      Left lower leg: No edema.   Lymphadenopathy:      Cervical: No cervical adenopathy.   Skin:     General: Skin is warm and dry.      Capillary Refill: Capillary refill takes less than 2 seconds.      Findings: No rash.   Neurological:      General: No focal deficit present.      Mental Status: She is alert and oriented to person, place, and time.      Cranial Nerves: No cranial nerve deficit.      Gait: Gait normal.      Deep Tendon Reflexes: Reflexes normal.   Psychiatric:         Mood and Affect: Mood normal.         Thought Content: Thought content normal.         Procedures    Assessment / Plan      Assessment/Plan:   Diagnoses and all orders for this visit:    1. Healthcare maintenance (Primary)  -     Comprehensive Metabolic Panel; Future  -     CBC & Differential; Future  -     Lipid Panel With / Chol / HDL Ratio; Future  -     TSH Rfx On Abnormal To Free T4; Future  -     Hemoglobin A1c; Future  -     HIV-1/O/2 Ag/Ab w Reflex; Future  -     Hepatitis C antibody; Future    2. Specific reading difficulty  Assessment & Plan:  - Referral sent to behavioral health regarding formal testing for this.  - Informed patient that once a formal diagnosis is reached, can provide school accomodations.       3. Hair loss  Assessment & Plan:  - TSH today    Orders:  -     CBC & Differential; Future  -      TSH Rfx On Abnormal To Free T4; Future    4. Acne, unspecified acne type  Assessment & Plan:  - Discussed that it could be related to cyclic hormonal changes.   - Encouraged to discuss with gynecology  - Can discuss specific treatment (doxycycline or spironolactone) at next visit if symptoms are still present.       5. Anxiety  Assessment & Plan:  - Per patient, well-managed  - Continue regular therapy      6. Screening for HIV without presence of risk factors  -     HIV-1/O/2 Ag/Ab w Reflex; Future    7. Encounter for hepatitis C screening test for low risk patient  -     Hepatitis C antibody; Future    8. Need for Tdap vaccination  -     Tdap Vaccine => 8yo IM (BOOSTRIX/ADACEL)    Other orders  -     Cancel: Fluzone >6mos ([id number])         Healthcare Maintenance:  Counseling provided based on age appropriate USPSTF guidelines. Preventive counseling and anticipatory guidance discussed on the following topics: nutrition, physical activity, family planning/contraception, sexual behavior and STDs, dental health, mental health, and immunizations         Albertina Sinclair voices understanding and acceptance of this advice and will call back with any further questions or concerns. AVS with preventive healthcare tips printed for patient.     “Discussed risks/benefits to vaccination, reviewed components of the vaccine, discussed VIS, discussed informed consent, informed consent obtained. Patient/Parent was allowed to accept or refuse vaccine. Questions answered to satisfactory state of patient/Parent. We reviewed typical age appropriate and seasonally appropriate vaccinations. Reviewed immunization history and updated state vaccination form as needed. Patient was counseled on Tdap    Follow Up:   Return in about 4 months (around 2/21/2025) for Next scheduled follow up.        Hayley Horvath PA-C

## 2024-10-21 NOTE — LETTER
[facility]  Vaccine Consent Form    Patient Name:  Albertina Sinclair  Patient :  2005     Vaccine(s) Ordered    Tdap Vaccine => 6yo IM (BOOSTRIX/ADACEL)        Screening Checklist  The following questions should be completed prior to vaccination. If you answer “yes” to any question, it does not necessarily mean you should not be vaccinated. It just means we may need to clarify or ask more questions. If a question is unclear, please ask your healthcare provider to explain it.    Yes No   Any fever or moderate to severe illness today (mild illness and/or antibiotic treatment are not contraindications)?     Do you have a history of a serious reaction to any previous vaccinations, such as anaphylaxis, encephalopathy within 7 days, Guillain-Lakewood syndrome within 6 weeks, seizure?     Have you received any live vaccine(s) (e.g MMR, QUYEN) or any other vaccines in the last month (to ensure duplicate doses aren't given)?     Do you have an anaphylactic allergy to latex (DTaP, DTaP-IPV, Hep A, Hep B, MenB, RV, Td, Tdap), baker’s yeast (Hep B, HPV), polysorbates (RSV, nirsevimab, PCV 20, Rotavirrus, Tdap, Shingrix), or gelatin (QUYEN, MMR)?     Do you have an anaphylactic allergy to neomycin (Rabies, QUYEN, MMR, IPV, Hep A), polymyxin B (IPV), or streptomycin (IPV)?      Any cancer, leukemia, AIDS, or other immune system disorder? (QUYEN, MMR, RV)     Do you have a parent, brother, or sister with an immune system problem (if immune competence of vaccine recipient clinically verified, can proceed)? (MMR, QUYEN)     Any recent steroid treatments for >2 weeks, chemotherapy, or radiation treatment? (QUYEN, MMR)     Have you received antibody-containing blood transfusions or IVIG in the past 11 months (recommended interval is dependent on product)? (MMR, QUYEN)     Have you taken antiviral drugs (acyclovir, famciclovir, valacyclovir for QUYEN) in the last 24 or 48 hours, respectively?      Are you pregnant or planning to become pregnant  "within 1 month? (QUYEN, MMR, HPV, IPV, MenB, Abrexvy; For Hep B- refer to Engerix-B; For RSV - Abrysvo is indicated for 32-36 weeks of pregnancy from September to January)     For infants, have you ever been told your child has had intussusception or a medical emergency involving obstruction of the intestine (Rotavirus)? If not for an infant, can skip this question.         *Ordering Physicians/APC should be consulted if \"yes\" is checked by the patient or guardian above.  I have received, read, and understand the Vaccine Information Statement (VIS) for each vaccine ordered.  I have considered my or my child's health status as well as the health status of my close contacts.  I have taken the opportunity to discuss my vaccine questions with my or my child's health care provider.   I have requested that the ordered vaccine(s) be given to me or my child.  I understand the benefits and risks of the vaccines.  I understand that I should remain in the clinic for 15 minutes after receiving the vaccine(s).  _________________________________________________________  Signature of Patient or Parent/Legal Guardian ____________________  Date     "

## 2024-10-22 LAB
ALBUMIN SERPL-MCNC: 4.7 G/DL (ref 3.5–5.2)
ALBUMIN/GLOB SERPL: 1.6 G/DL
ALP SERPL-CCNC: 57 U/L (ref 39–117)
ALT SERPL W P-5'-P-CCNC: 9 U/L (ref 1–33)
ANION GAP SERPL CALCULATED.3IONS-SCNC: 12.7 MMOL/L (ref 5–15)
AST SERPL-CCNC: 15 U/L (ref 1–32)
BILIRUB SERPL-MCNC: 0.4 MG/DL (ref 0–1.2)
BUN SERPL-MCNC: 9 MG/DL (ref 6–20)
BUN/CREAT SERPL: 10.5 (ref 7–25)
CALCIUM SPEC-SCNC: 10.1 MG/DL (ref 8.6–10.5)
CHLORIDE SERPL-SCNC: 105 MMOL/L (ref 98–107)
CO2 SERPL-SCNC: 24.3 MMOL/L (ref 22–29)
CREAT SERPL-MCNC: 0.86 MG/DL (ref 0.57–1)
EGFRCR SERPLBLD CKD-EPI 2021: 98.7 ML/MIN/1.73
GLOBULIN UR ELPH-MCNC: 2.9 GM/DL
GLUCOSE SERPL-MCNC: 83 MG/DL (ref 65–99)
POTASSIUM SERPL-SCNC: 4.6 MMOL/L (ref 3.5–5.2)
PROT SERPL-MCNC: 7.6 G/DL (ref 6–8.5)
SODIUM SERPL-SCNC: 142 MMOL/L (ref 136–145)
TSH SERPL DL<=0.05 MIU/L-ACNC: 0.71 UIU/ML (ref 0.27–4.2)

## 2024-10-24 ENCOUNTER — PATIENT ROUNDING (BHMG ONLY) (OUTPATIENT)
Dept: FAMILY MEDICINE CLINIC | Facility: CLINIC | Age: 19
End: 2024-10-24
Payer: COMMERCIAL

## 2024-10-24 ENCOUNTER — PATIENT MESSAGE (OUTPATIENT)
Dept: FAMILY MEDICINE CLINIC | Facility: CLINIC | Age: 19
End: 2024-10-24
Payer: COMMERCIAL

## 2024-10-25 DIAGNOSIS — F81.0: Primary | ICD-10-CM

## 2024-10-29 ENCOUNTER — HOSPITAL ENCOUNTER (OUTPATIENT)
Facility: HOSPITAL | Age: 19
Setting detail: HOSPITAL OUTPATIENT SURGERY
Discharge: HOME | End: 2024-10-29
Attending: ORTHOPAEDIC SURGERY | Admitting: ORTHOPAEDIC SURGERY
Payer: COMMERCIAL

## 2024-10-29 ENCOUNTER — ANESTHESIA (OUTPATIENT)
Dept: PERIOP | Facility: HOSPITAL | Age: 19
End: 2024-10-29
Payer: COMMERCIAL

## 2024-10-29 ENCOUNTER — ANESTHESIA EVENT (OUTPATIENT)
Dept: PERIOP | Facility: HOSPITAL | Age: 19
End: 2024-10-29
Payer: COMMERCIAL

## 2024-10-29 VITALS
HEIGHT: 59 IN | RESPIRATION RATE: 16 BRPM | TEMPERATURE: 98 F | WEIGHT: 147.93 LBS | BODY MASS INDEX: 29.82 KG/M2 | HEART RATE: 64 BPM | SYSTOLIC BLOOD PRESSURE: 110 MMHG | OXYGEN SATURATION: 97 % | DIASTOLIC BLOOD PRESSURE: 74 MMHG

## 2024-10-29 DIAGNOSIS — S83.242D OTHER TEAR OF MEDIAL MENISCUS OF LEFT KNEE AS CURRENT INJURY, SUBSEQUENT ENCOUNTER: ICD-10-CM

## 2024-10-29 LAB
B-HCG UR QL: NEGATIVE
EXPIRATION DATE: NORMAL
INTERNAL NEGATIVE CONTROL: NORMAL
INTERNAL POSITIVE CONTROL: NORMAL
Lab: NORMAL

## 2024-10-29 PROCEDURE — 29880 ARTHRS KNE SRG MNISECTMY M&L: CPT | Performed by: ORTHOPAEDIC SURGERY

## 2024-10-29 PROCEDURE — 25010000002 HYDROMORPHONE PER 4 MG: Performed by: NURSE ANESTHETIST, CERTIFIED REGISTERED

## 2024-10-29 PROCEDURE — 25010000002 FENTANYL CITRATE (PF) 50 MCG/ML SOLUTION: Performed by: NURSE ANESTHETIST, CERTIFIED REGISTERED

## 2024-10-29 PROCEDURE — 25010000002 ONDANSETRON PER 1 MG: Performed by: NURSE ANESTHETIST, CERTIFIED REGISTERED

## 2024-10-29 PROCEDURE — 25010000002 DEXAMETHASONE SODIUM PHOSPHATE 20 MG/5ML SOLUTION: Performed by: NURSE ANESTHETIST, CERTIFIED REGISTERED

## 2024-10-29 PROCEDURE — 81025 URINE PREGNANCY TEST: CPT | Performed by: ANESTHESIOLOGY

## 2024-10-29 PROCEDURE — 25810000003 LACTATED RINGERS PER 1000 ML: Performed by: ANESTHESIOLOGY

## 2024-10-29 PROCEDURE — 25010000002 KETOROLAC TROMETHAMINE PER 15 MG: Performed by: NURSE ANESTHETIST, CERTIFIED REGISTERED

## 2024-10-29 PROCEDURE — 25010000002 PROPOFOL 10 MG/ML EMULSION: Performed by: NURSE ANESTHETIST, CERTIFIED REGISTERED

## 2024-10-29 PROCEDURE — 25010000002 LIDOCAINE 2% SOLUTION: Performed by: NURSE ANESTHETIST, CERTIFIED REGISTERED

## 2024-10-29 RX ORDER — LIDOCAINE HYDROCHLORIDE 10 MG/ML
0.5 INJECTION, SOLUTION INFILTRATION; PERINEURAL ONCE AS NEEDED
Status: DISCONTINUED | OUTPATIENT
Start: 2024-10-29 | End: 2024-10-29 | Stop reason: HOSPADM

## 2024-10-29 RX ORDER — FENTANYL CITRATE 50 UG/ML
50 INJECTION, SOLUTION INTRAMUSCULAR; INTRAVENOUS
Status: DISCONTINUED | OUTPATIENT
Start: 2024-10-29 | End: 2024-10-29 | Stop reason: HOSPADM

## 2024-10-29 RX ORDER — SODIUM CHLORIDE 0.9 % (FLUSH) 0.9 %
3-10 SYRINGE (ML) INJECTION AS NEEDED
Status: DISCONTINUED | OUTPATIENT
Start: 2024-10-29 | End: 2024-10-29 | Stop reason: HOSPADM

## 2024-10-29 RX ORDER — NALOXONE HCL 0.4 MG/ML
0.2 VIAL (ML) INJECTION AS NEEDED
Status: DISCONTINUED | OUTPATIENT
Start: 2024-10-29 | End: 2024-10-29 | Stop reason: HOSPADM

## 2024-10-29 RX ORDER — PROMETHAZINE HYDROCHLORIDE 25 MG/1
25 TABLET ORAL ONCE AS NEEDED
Status: DISCONTINUED | OUTPATIENT
Start: 2024-10-29 | End: 2024-10-29 | Stop reason: HOSPADM

## 2024-10-29 RX ORDER — BUPIVACAINE HYDROCHLORIDE AND EPINEPHRINE 2.5; 5 MG/ML; UG/ML
INJECTION, SOLUTION INFILTRATION; PERINEURAL AS NEEDED
Status: DISCONTINUED | OUTPATIENT
Start: 2024-10-29 | End: 2024-10-29 | Stop reason: HOSPADM

## 2024-10-29 RX ORDER — EPHEDRINE SULFATE 50 MG/ML
5 INJECTION, SOLUTION INTRAVENOUS ONCE AS NEEDED
Status: DISCONTINUED | OUTPATIENT
Start: 2024-10-29 | End: 2024-10-29 | Stop reason: HOSPADM

## 2024-10-29 RX ORDER — FENTANYL CITRATE 50 UG/ML
INJECTION, SOLUTION INTRAMUSCULAR; INTRAVENOUS AS NEEDED
Status: DISCONTINUED | OUTPATIENT
Start: 2024-10-29 | End: 2024-10-29 | Stop reason: SURG

## 2024-10-29 RX ORDER — ONDANSETRON 2 MG/ML
4 INJECTION INTRAMUSCULAR; INTRAVENOUS ONCE AS NEEDED
Status: DISCONTINUED | OUTPATIENT
Start: 2024-10-29 | End: 2024-10-29 | Stop reason: HOSPADM

## 2024-10-29 RX ORDER — FENTANYL CITRATE 50 UG/ML
50 INJECTION, SOLUTION INTRAMUSCULAR; INTRAVENOUS ONCE AS NEEDED
Status: DISCONTINUED | OUTPATIENT
Start: 2024-10-29 | End: 2024-10-29 | Stop reason: HOSPADM

## 2024-10-29 RX ORDER — IPRATROPIUM BROMIDE AND ALBUTEROL SULFATE 2.5; .5 MG/3ML; MG/3ML
3 SOLUTION RESPIRATORY (INHALATION) ONCE AS NEEDED
Status: DISCONTINUED | OUTPATIENT
Start: 2024-10-29 | End: 2024-10-29 | Stop reason: HOSPADM

## 2024-10-29 RX ORDER — KETOROLAC TROMETHAMINE 30 MG/ML
INJECTION, SOLUTION INTRAMUSCULAR; INTRAVENOUS AS NEEDED
Status: DISCONTINUED | OUTPATIENT
Start: 2024-10-29 | End: 2024-10-29 | Stop reason: SURG

## 2024-10-29 RX ORDER — SODIUM CHLORIDE, SODIUM LACTATE, POTASSIUM CHLORIDE, AND CALCIUM CHLORIDE .6; .31; .03; .02 G/100ML; G/100ML; G/100ML; G/100ML
IRRIGANT IRRIGATION AS NEEDED
Status: DISCONTINUED | OUTPATIENT
Start: 2024-10-29 | End: 2024-10-29 | Stop reason: HOSPADM

## 2024-10-29 RX ORDER — SODIUM CHLORIDE 0.9 % (FLUSH) 0.9 %
3 SYRINGE (ML) INJECTION EVERY 12 HOURS SCHEDULED
Status: DISCONTINUED | OUTPATIENT
Start: 2024-10-29 | End: 2024-10-29 | Stop reason: HOSPADM

## 2024-10-29 RX ORDER — DEXAMETHASONE SODIUM PHOSPHATE 4 MG/ML
INJECTION, SOLUTION INTRA-ARTICULAR; INTRALESIONAL; INTRAMUSCULAR; INTRAVENOUS; SOFT TISSUE AS NEEDED
Status: DISCONTINUED | OUTPATIENT
Start: 2024-10-29 | End: 2024-10-29 | Stop reason: SURG

## 2024-10-29 RX ORDER — DIPHENHYDRAMINE HYDROCHLORIDE 50 MG/ML
12.5 INJECTION INTRAMUSCULAR; INTRAVENOUS
Status: DISCONTINUED | OUTPATIENT
Start: 2024-10-29 | End: 2024-10-29 | Stop reason: HOSPADM

## 2024-10-29 RX ORDER — DROPERIDOL 2.5 MG/ML
0.62 INJECTION, SOLUTION INTRAMUSCULAR; INTRAVENOUS
Status: DISCONTINUED | OUTPATIENT
Start: 2024-10-29 | End: 2024-10-29 | Stop reason: HOSPADM

## 2024-10-29 RX ORDER — HYDROCODONE BITARTRATE AND ACETAMINOPHEN 5; 325 MG/1; MG/1
1 TABLET ORAL ONCE AS NEEDED
Status: COMPLETED | OUTPATIENT
Start: 2024-10-29 | End: 2024-10-29

## 2024-10-29 RX ORDER — LABETALOL HYDROCHLORIDE 5 MG/ML
5 INJECTION, SOLUTION INTRAVENOUS
Status: DISCONTINUED | OUTPATIENT
Start: 2024-10-29 | End: 2024-10-29 | Stop reason: HOSPADM

## 2024-10-29 RX ORDER — PROPOFOL 10 MG/ML
VIAL (ML) INTRAVENOUS AS NEEDED
Status: DISCONTINUED | OUTPATIENT
Start: 2024-10-29 | End: 2024-10-29 | Stop reason: SURG

## 2024-10-29 RX ORDER — LIDOCAINE HYDROCHLORIDE 20 MG/ML
INJECTION, SOLUTION INFILTRATION; PERINEURAL AS NEEDED
Status: DISCONTINUED | OUTPATIENT
Start: 2024-10-29 | End: 2024-10-29 | Stop reason: SURG

## 2024-10-29 RX ORDER — ONDANSETRON 2 MG/ML
INJECTION INTRAMUSCULAR; INTRAVENOUS AS NEEDED
Status: DISCONTINUED | OUTPATIENT
Start: 2024-10-29 | End: 2024-10-29 | Stop reason: SURG

## 2024-10-29 RX ORDER — FLUMAZENIL 0.1 MG/ML
0.2 INJECTION INTRAVENOUS AS NEEDED
Status: DISCONTINUED | OUTPATIENT
Start: 2024-10-29 | End: 2024-10-29 | Stop reason: HOSPADM

## 2024-10-29 RX ORDER — SODIUM CHLORIDE, SODIUM LACTATE, POTASSIUM CHLORIDE, CALCIUM CHLORIDE 600; 310; 30; 20 MG/100ML; MG/100ML; MG/100ML; MG/100ML
9 INJECTION, SOLUTION INTRAVENOUS CONTINUOUS
Status: DISCONTINUED | OUTPATIENT
Start: 2024-10-29 | End: 2024-10-29 | Stop reason: HOSPADM

## 2024-10-29 RX ORDER — OXYCODONE AND ACETAMINOPHEN 7.5; 325 MG/1; MG/1
1 TABLET ORAL EVERY 4 HOURS PRN
Status: DISCONTINUED | OUTPATIENT
Start: 2024-10-29 | End: 2024-10-29 | Stop reason: HOSPADM

## 2024-10-29 RX ORDER — HYDROCODONE BITARTRATE AND ACETAMINOPHEN 5; 325 MG/1; MG/1
1 TABLET ORAL EVERY 4 HOURS PRN
Qty: 12 TABLET | Refills: 0 | Status: SHIPPED | OUTPATIENT
Start: 2024-10-29

## 2024-10-29 RX ORDER — HYDROMORPHONE HYDROCHLORIDE 1 MG/ML
0.5 INJECTION, SOLUTION INTRAMUSCULAR; INTRAVENOUS; SUBCUTANEOUS
Status: DISCONTINUED | OUTPATIENT
Start: 2024-10-29 | End: 2024-10-29 | Stop reason: HOSPADM

## 2024-10-29 RX ORDER — FAMOTIDINE 10 MG/ML
20 INJECTION, SOLUTION INTRAVENOUS ONCE
Status: COMPLETED | OUTPATIENT
Start: 2024-10-29 | End: 2024-10-29

## 2024-10-29 RX ORDER — HYDRALAZINE HYDROCHLORIDE 20 MG/ML
5 INJECTION INTRAMUSCULAR; INTRAVENOUS
Status: DISCONTINUED | OUTPATIENT
Start: 2024-10-29 | End: 2024-10-29 | Stop reason: HOSPADM

## 2024-10-29 RX ORDER — MIDAZOLAM HYDROCHLORIDE 1 MG/ML
1 INJECTION, SOLUTION INTRAMUSCULAR; INTRAVENOUS
Status: DISCONTINUED | OUTPATIENT
Start: 2024-10-29 | End: 2024-10-29 | Stop reason: HOSPADM

## 2024-10-29 RX ORDER — PROMETHAZINE HYDROCHLORIDE 25 MG/1
25 SUPPOSITORY RECTAL ONCE AS NEEDED
Status: DISCONTINUED | OUTPATIENT
Start: 2024-10-29 | End: 2024-10-29 | Stop reason: HOSPADM

## 2024-10-29 RX ADMIN — ONDANSETRON 4 MG: 2 INJECTION INTRAMUSCULAR; INTRAVENOUS at 07:43

## 2024-10-29 RX ADMIN — PROPOFOL 25 MCG/KG/MIN: 10 INJECTION, EMULSION INTRAVENOUS at 07:08

## 2024-10-29 RX ADMIN — LIDOCAINE HYDROCHLORIDE 100 MG: 20 INJECTION, SOLUTION INFILTRATION; PERINEURAL at 07:04

## 2024-10-29 RX ADMIN — DEXAMETHASONE SODIUM PHOSPHATE 10 MG: 4 INJECTION, SOLUTION INTRAMUSCULAR; INTRAVENOUS at 07:13

## 2024-10-29 RX ADMIN — FENTANYL CITRATE 50 MCG: 50 INJECTION, SOLUTION INTRAMUSCULAR; INTRAVENOUS at 08:16

## 2024-10-29 RX ADMIN — FAMOTIDINE 20 MG: 10 INJECTION INTRAVENOUS at 06:10

## 2024-10-29 RX ADMIN — FENTANYL CITRATE 50 MCG: 50 INJECTION, SOLUTION INTRAMUSCULAR; INTRAVENOUS at 07:16

## 2024-10-29 RX ADMIN — HYDROCODONE BITARTRATE AND ACETAMINOPHEN 1 TABLET: 5; 325 TABLET ORAL at 08:16

## 2024-10-29 RX ADMIN — KETOROLAC TROMETHAMINE 30 MG: 30 INJECTION, SOLUTION INTRAMUSCULAR at 07:43

## 2024-10-29 RX ADMIN — PROPOFOL 300 MG: 10 INJECTION, EMULSION INTRAVENOUS at 07:04

## 2024-10-29 RX ADMIN — HYDROMORPHONE HYDROCHLORIDE 0.5 MG: 1 INJECTION, SOLUTION INTRAMUSCULAR; INTRAVENOUS; SUBCUTANEOUS at 08:40

## 2024-10-29 RX ADMIN — SODIUM CHLORIDE, POTASSIUM CHLORIDE, SODIUM LACTATE AND CALCIUM CHLORIDE 9 ML/HR: 600; 310; 30; 20 INJECTION, SOLUTION INTRAVENOUS at 06:11

## 2024-10-29 NOTE — ADDENDUM NOTE
Addendum  created 10/29/24 0931 by Iain Delatorre MD    Attestation recorded in Intraprocedure, Intraprocedure Attestations filed

## 2024-10-29 NOTE — H&P
History & Physical       Patient: Albertina Sinclair    Date of Admission: 10/29/2024  5:06 AM    YOB: 2005    Medical Record Number: [id number]    Attending Physician: Neelam Ying MD        Chief Complaints: Other tear of medial meniscus of left knee as current injury, subsequent encounter [S83.242D]      History of Present Illness: This patient is a several month history of left knee pain she is status post ACL reconstruction several years ago she has an exam and an MRI which are consistent with a new medial meniscus tear and she presents for arthroscopy partial medial meniscectomy.  I do not think any of this will be amenable to a repair     Allergies: No Known Allergies    Medications:   Home Medications:  No current facility-administered medications on file prior to encounter.     No current outpatient medications on file prior to encounter.     Current Medications:  Scheduled Meds:ceFAZolin, 2 g, Intravenous, Once  sodium chloride, 3 mL, Intravenous, Q12H      Continuous Infusions:lactated ringers, 9 mL/hr, Last Rate: 9 mL/hr (10/29/24 0611)      PRN Meds:.  fentanyl    lidocaine    midazolam    sodium chloride    Past Medical History:   Diagnosis Date    Anxiety     Body piercing 2021    new nose piercing.. can't remove     COVID-19 virus infection 2020    Left knee injury     playing soccer     Left knee pain     Migraines     Tear of meniscus of knee         Past Surgical History:   Procedure Laterality Date    KNEE ACL RECONSTRUCTION Left 12/21/2021    Procedure: LEFT KNEE ARTHROSCOPY, PARTIAL MEDIAL AND LATERAL MENISCECTOMY, ANTERIOR CRUCIATE LIGAMENT RECONSTRUCTION WITH AUTOGRAFT  w mcq  12/21;  Surgeon: Neelam Ying MD;  Location[facility] [place];  Service: Orthopedics;  Laterality: Left;    LEG SURGERY      age 3        Social History     Occupational History    Not on file   Tobacco Use    Smoking status: Never    Smokeless tobacco: Never   Vaping Use    Vaping status: Never Used    Substance and Sexual Activity    Alcohol use: Never    Drug use: Never    Sexual activity: Not Currently     Partners: Male     Birth control/protection: Condom      Social History     Social History Narrative    Not on file        Family History   Problem Relation Age of Onset    No Known Problems Mother     Hypertension Father     Malig Hyperthermia Neg Hx        Review of Systems      Physical Exam: 19 y.o. female  General Appearance:    Alert, cooperative, in no acute distress                      Vitals:    10/29/24 0552   BP: 106/76   BP Location: Left arm   Patient Position: Sitting   Pulse: 74   Resp: 16   Temp: 97.9 °F (36.6 °C)   TempSrc: Oral   SpO2: 97%        Head:  Normocephalic, without obvious abnormality, atraumatic   Eyes:          Conjunctivae and sclerae normal, no pallor, corneas clear,    Ears:  Ears appear intact with no abnormalities noted   Throat: No oral lesions, no thrush, oral mucosa moist   Neck: No adenopathy, supple, trachea midline, no thyromegaly,    Back:   No kyphosis present, no scoliosis present, no skin lesions,      erythema or scars, no tenderness to percussion or                   palpation,range of motion normal   Lungs:   C respirations regular, even and                 unlabored    Heart:  Regular rhythm and normal rate               Chest Wall:  No abnormalities observed               Pulses: Pulses palpable and equal bilaterally   Skin: No bleeding, bruising or rash   Lymph nodes: No palpable adenopathy   Neurologic: Appears neurologic intact             Assessment:    Tear of medial meniscus of left knee, current          Plan: All risks, benefits and alternatives were discussed.  Risks including but not exclusive to anesthetic complications, including death, MI, CVA, infection, bleeding DVT, PE,  fracture, residual pain and need for future surgery.  Patient understood all and agrees to proceed.

## 2024-10-29 NOTE — ANESTHESIA PROCEDURE NOTES
Airway  Urgency: elective    Date/Time: 10/29/2024 7:06 AM    General Information and Staff    Patient location during procedure: OR  CRNA/CAA: Charmaine Carlson CRNA    Indications and Patient Condition  Indications for airway management: airway protection    Preoxygenated: yes  MILS maintained throughout  Mask difficulty assessment: 1 - vent by mask    Final Airway Details  Final airway type: supraglottic airway      Successful airway: LMA  Size 3     Number of attempts at approach: 1  Assessment: lips, teeth, and gum same as pre-op and atraumatic intubation    Additional Comments  Teeth, tongue, lips, and gums in preop condition. VSS throughout. Easy mask/smooth easy insertion.

## 2024-10-29 NOTE — ANESTHESIA PREPROCEDURE EVALUATION
Anesthesia Evaluation     Patient summary reviewed   no history of anesthetic complications:   NPO Solid Status: > 8 hours  NPO Liquid Status: > 2 hours           Airway   Mallampati: II  TM distance: >3 FB  Neck ROM: full  No difficulty expected  Dental - normal exam     Pulmonary     breath sounds clear to auscultation  (-) shortness of breath, recent URI, not a smokerSleep apnea: STOP BANG 0.  Cardiovascular - negative cardio ROS  Exercise tolerance: excellent (>7 METS)    Rhythm: regular  Rate: normal    (-) past MI, dysrhythmias, angina      Neuro/Psych  (+) headaches (Migraine hx), psychiatric history Anxiety  (-) seizures, CVA  GI/Hepatic/Renal/Endo    (-)  obesityRenal disease: Cr 0.86.    Musculoskeletal     (-) neck stiffness      ROS comment: Prvs L ACL recon, 12/2021  Abdominal    Substance History      OB/GYN  Gestational age approximate: LMP 10/14/2024.        Other        (-) blood dyscrasia                Anesthesia Plan    ASA 2     general     (I have reviewed the patient's history and chart with the patient, including all pertinent laboratory results and imaging. I have explained the risks of anesthesia including but not limited to dental damage, corneal abrasion, nerve injury, MI, stroke, aspiration, and death. Patient has agreed to proceed.  )  intravenous induction     Anesthetic plan, risks, benefits, and alternatives have been provided, discussed and informed consent has been obtained with: patient.    Use of blood products discussed with patient .      CODE STATUS:

## 2024-10-29 NOTE — ANESTHESIA POSTPROCEDURE EVALUATION
Patient: Albertina Sinclair    Procedure Summary       Date: 10/29/24 Room / Location:  [facility] [address] [place] [address] [facility]    Anesthesia Start: 0658 Anesthesia Stop: 0759    Procedure: KNEE ARTHROSCOPY WITH PARTIAL MEDIAL AND LATERAL MENISCETOMY (Left: Knee) Diagnosis:       Other tear of medial meniscus of left knee as current injury, subsequent encounter      (Other tear of medial meniscus of left knee as current injury, subsequent encounter [O42.024N])    Surgeons: Neelam Ying MD Provider: Iain Delatorre MD    Anesthesia Type: general ASA Status: 2            Anesthesia Type: general    Vitals  Vitals Value Taken Time   /66 10/29/24 0908   Temp 36.7 °C (98 °F) 10/29/24 0857   Pulse 64 10/29/24 0917   Resp 16 10/29/24 0857   SpO2 99 % 10/29/24 0917   Vitals shown include unfiled device data.        Post Anesthesia Care and Evaluation    Patient location during evaluation: bedside  Patient participation: complete - patient participated  Level of consciousness: awake and alert  Pain management: adequate    Airway patency: patent  Anesthetic complications: No anesthetic complications  PONV Status: controlled  Cardiovascular status: acceptable and hemodynamically stable  Respiratory status: acceptable, spontaneous ventilation and nonlabored ventilation  Hydration status: acceptable    Comments: /70   Pulse 59   Temp 36.7 °C (98 °F) (Oral)   Resp 16   LMP 10/14/2024 (Approximate)   SpO2 95%

## 2024-10-29 NOTE — OP NOTE
Operative Note      Facility: [facility]  Patient Name: Albertina Sinclair  YOB: 2005  Date: 10/29/2024  Medical Record Number: [id number]      Pre-op Diagnosis:   Other tear of medial meniscus of left knee as current injury, subsequent encounter [S83.242D]    Post-Op Diagnosis Codes:     * Other tear of medial meniscus of left knee as current injury, subsequent encounter [S83.242D]  Lateral meniscus tear  Procedure(s):  Left KNEE ARTHROSCOPY WITH PARTIAL MEDIAL AND LATERAL MENISCETOMY    Surgeon(s):  Neelam Ying MD    Anesthesia: General  Anesthesiologist: Iain Delatorre MD  CRNA: Charmaine Carlson CRNA    Staff:   Circulator: Cristina Donis RN  Scrub Person: Javier Ibrahim    Assistants : none      Estimated Blood Loss: 5 mL    Specimens:    none     Drains: None    Findings: See Dictation    Complications: None      Indication for procedure: This patient has had a several month history of knee pain and has an exam and an MRI which are consistent with meniscal pathology. They understand all options and wish to proceed with arthroscopy.      Description of procedure: The patient was taken to the operating room. They were placed supine on the operating room table. After induction of adequate LMA anesthesia, IV antibiotics the patient underwent exam under anesthesia with symmetric full range of motion. Nonsterile tourniquet was applied patient was placed in the thigh lutz all prominent areas were well padded and end of the table dropped. The leg was prepped and draped in usual sterile fashion. Standard lateral incision was made with 11 blade. Blunt trocar penetrated into the joint, scope followed and the evaluation began.  The patella.  Is a centrally within the trochlear groove cartilage was intact on both then entered the medial compartment under spinal needle localization direct visualization and medial portals established she had an obvious fragmented bucket-handle tear  of the medial meniscus with a fragment that was flipped into the notch and a fragment that was flipped anteriorly.  These were resected with biters baskets motorized lin ultimately Apollo device.  The cartilage was intact the notch was intact the graft was intact and then entered the lateral compartment she had a central tear lateral meniscus more anterior than posterior this was resected with a motorized shaver and the Apollo device she had a small defect in the lateral femoral condyle cartilage felt to be grade 2 on the most lateral aspect this was gently debrided with a motorized shaver            At this point everything was thoroughly irrigated it was suctioned all 3 compartments, the gutters the suprapatellar pouch were all evaluated there was no further acute pathology seen.  Everything was thoroughly irrigated it was injected with Marcaine.  The portals were closed with 3-0 nylon interrupted fashion.  Sterile dressings and Ace wraps were applied.  The patient tolerated the procedure well and was taken to recovery room in good condition.  All sponge and needle counts were correct.                    Date: 10/29/2024  Time: 07:59 EDT

## 2024-11-13 NOTE — PROGRESS NOTES
Knee scope follow-up    Patient: Albertina Sinclair        YOB: 2005    Medical Record Number: [id number]        Chief Complaints: Left knee pain      History of Present Illness: This is a patient is status post left knee arthroscopy she states she is doing great her knee feels much better she is trying to minor restrictions        Allergies: No Known Allergies    Medications:   Home Medications:  Current Outpatient Medications on File Prior to Visit   Medication Sig    HYDROcodone-acetaminophen (NORCO) 5-325 MG per tablet Take 1 tablet by mouth Every 4 (Four) Hours As Needed for Severe Pain.     No current facility-administered medications on file prior to visit.     Current Medications:  Scheduled Meds:  Continuous Infusions:No current facility-administered medications for this visit.    PRN Meds:.    Past Medical History:   Diagnosis Date    Anxiety     Body piercing 2021    new nose piercing.. can't remove     COVID-19 virus infection 2020    Left knee injury     playing soccer     Left knee pain     Migraines     Tear of meniscus of knee         Past Surgical History:   Procedure Laterality Date    KNEE ACL RECONSTRUCTION Left 12/21/2021    Procedure: LEFT KNEE ARTHROSCOPY, PARTIAL MEDIAL AND LATERAL MENISCECTOMY, ANTERIOR CRUCIATE LIGAMENT RECONSTRUCTION WITH AUTOGRAFT  w [facility]  12/21;  Surgeon: Neelam Ying MD;  Location: [facility];  Service: Orthopedics;  Laterality: Left;    KNEE ARTHROSCOPY Left 10/29/2024    Procedure: KNEE ARTHROSCOPY WITH PARTIAL MEDIAL AND LATERAL MENISCETOMY;  Surgeon: Neelam Ying MD;  Location: [facility];  Service: Orthopedics;  Laterality: Left;    LEG SURGERY      age 3        Social History     Occupational History    Not on file   Tobacco Use    Smoking status: Never    Smokeless tobacco: Never   Vaping Use    Vaping status: Never Used   Substance and Sexual Activity    Alcohol use: Never    Drug use: Never    Sexual activity: Not Currently      Partners: Male     Birth control/protection: Condom      Social History     Social History Narrative    Not on file        Family History   Problem Relation Age of Onset    No Known Problems Mother     Hypertension Father     Malig Hyperthermia Neg Hx              Review of Systems:     Review of Systems      Physical Exam: 19 y.o. female  General Appearance:    Alert, cooperative, in no acute distress                 There were no vitals filed for this visit.   Patient is alert and read ×3 no acute distress appears her above-listed at height weight and age.  Affect is normal respiratory rate is normal unlabored. Heart rate regular rate rhythm, sclera, dentition and hearing are normal for the purpose of this exam.        Ortho Exam  Exam of her knee incisions look great sutures were removed Steri-Strips applied she has no effusion she does have quad atrophy she has good range of motion  Procedures                 Assessment/Plan: Status post knee arthroscopy I did review her intraoperative pictures with her we remove her sutures today placed Steri-Strips start of physical therapy and I will see her back in 4 weeks if she is doing well she may call and cancel

## 2024-11-14 ENCOUNTER — OFFICE VISIT (OUTPATIENT)
Dept: ORTHOPEDIC SURGERY | Facility: CLINIC | Age: 19
End: 2024-11-14
Payer: COMMERCIAL

## 2024-11-14 VITALS — WEIGHT: 147.8 LBS | HEIGHT: 59 IN | BODY MASS INDEX: 29.8 KG/M2 | TEMPERATURE: 98.9 F

## 2024-11-14 DIAGNOSIS — Z98.890 S/P ARTHROSCOPY OF KNEE: Primary | ICD-10-CM

## 2025-05-16 ENCOUNTER — TELEMEDICINE (OUTPATIENT)
Age: 20
End: 2025-05-16
Payer: COMMERCIAL

## 2025-05-16 DIAGNOSIS — F41.1 GENERALIZED ANXIETY DISORDER: Primary | ICD-10-CM

## 2025-05-16 NOTE — PROGRESS NOTES
Assessment Consultation Note      Date:2025   Client Name: Albertina Sinclair  : 2005   MRN: [id number]   Time In: 2:45 PM    Time Out: 3:10 PM     Referring Provider:   Patient Care Team:  Hayley Horvath PA-C as PCP - General (Family Medicine)  Fausto Moore MD (Pediatrics)     Chief Complaint:      ICD-10-CM ICD-9-CM   1. Generalized anxiety disorder  F41.1 300.02        This provider is located at [address] [place] [address], [address], [place]. [address]. This visit is being done on behalf of [facility] [address] using a Zoom platform for a video visit in a secure and private environment. The Patient is seen remotely at their home address in [place], using a private computer, phone or tablet.  The patient is able to be seen through a [employer] Visit through [employer] at today's encounter. Patient is being evaluated/treated via telehealth by [employer], and stated they are in a secure environment for this session. The patient's condition being diagnosed/treated is appropriate for telemedicine. The provider identified herself as well as her credentials.   The patient, and/or patient's guardian, consent to being seen remotely, and when consent is given they understand that the consent allows for patient identifiable information to be sent to a third party as needed.   They may refuse to be seen remotely at any time. The electronic data is encrypted and password protected, and the patient and/or guardian has been advised of the potential risks to privacy not withstanding such measures.     You have chosen to receive care through a video visit today. Do you consent to use a video visit for your medical care today? Yes     History of Present Illness:   Albertina Sinclair is a 19 y.o. female ho presents for consultation related to Specific Learning Disorder (SLD) and Attention Deficit/Hyperactivity Disorder. She has been experiencing persistent difficulties with reading and  comprehension since childhood, often requiring multiple readings to grasp the meaning of written words. This issue was brought to her attention by her professor last semester, who suggested the possibility of dyslexia. Her primary care physician concurred and recommended a consultation with a specialist. As an , she recently encountered significant challenges when working with large data tables, frequently misreading numbers and struggling to track the correct spots on the table. This has led to poor academic performance.     Ms. Sinclair was fidgety and significantly anxious throughout session. Ms. Sinclair has a long-standing history of anxiety, dating back to her childhood, characterized by constant worry and stress. She reports feeling overwhelmed and anxious, particularly in social situations, and often experiences physical symptoms such as stomach discomfort. She also reports difficulty sleeping due to her anxiety.     The patient's anxiety symptoms and academic difficulties may also be indicative of ADHD, particularly given the high levels of anxiety she experiences. Further testing is warranted to evaluate for both dyslexia and ADHD.    Past Psychiatric History:   Ms. Sinclair reported that she is engaged in outpatient therapy. She stated that this has been helpful.     Objective     Mental Status Exam:   MENTAL STATUS EXAM   General Appearance:  Cleanly groomed and dressed and well developed  Eye Contact:  Good eye contact  Attitude:  Cooperative, polite and candid  Motor Activity:  Normal gait, posture and fidgety  Muscle Strength:  Normal  Speech:  Normal rate, tone, volume  Language:  Spontaneous  Mood and affect:  Normal, pleasant  Thought Process:  Logical and goal-directed  Associations/ Thought Content:  No delusions  Hallucinations:  None  Suicidal Ideations:  Not present  Homicidal Ideation:  Not present  Sensorium:  Alert and clear  Orientation:  Person, place, time and situation  Immediate Recall,  Recent, and Remote Memory:  Intact  Attention Span/ Concentration:  Good  Fund of Knowledge:  Appropriate for age and educational level  Intellectual Functioning:  Above average  Insight:  Fair  Judgement:  Good  Reliability:  Good  Impulse Control:  Fair     SUICIDE RISK ASSESSMENT/CSSRS:  1. Does client have thoughts of suicide? no  2. Does client have intent for suicide? no  3. Does client have a current plan for suicide? no  4. History of suicide attempts: no  5. Family history of suicide or attempts: no  6. History of violent behaviors towards others or property or thoughts of committing suicide: no  7. History of sexual aggression toward others: no  8. Access to firearms or weapons: no    Subjective     PHQ-9 Depression Screening:   PHQ-9 Total Score: (Patient-Rptd) 6     JOVANNY-7  Feeling nervous, anxious or on edge: (Patient-Rptd) More than half the days  Not being able to stop or control worrying: (Patient-Rptd) More than half the days  Worrying too much about different things: (Patient-Rptd) More than half the days  Trouble Relaxing: (Patient-Rptd) Several days  Being so restless that it is hard to sit still: (Patient-Rptd) Several days  Feeling afraid as if something awful might happen: (Patient-Rptd) Nearly every day  Becoming easily annoyed or irritable: (Patient-Rptd) Nearly every day  JOVANNY 7 Total Score: (Patient-Rptd) 14  If you checked any problems, how difficult have these problems made it for you to do your work, take care of things at home, or get along with other people: (Patient-Rptd) Somewhat difficult    Developmental History:  Ms. Sinclair reported that her mother's birth and pregnancy with her was normal. She also noted that she believes that she met her developmental milestones on time.     Interpersonal/Relational:  Marital Status: single  Ms. Sinclair denied a history of significant romantic relationships.   Support system: two parent,  family, friends, and patient siblings, friends, and  "roommates   Ms. Sinclair reported that she gets along well with her family including her five younger sisters.     Ms. Sinclair reported that she has a few very good friends who she gets along with well. She reported that she struggles to make friends because of her anxiety. She explained that she does not often go out of her way to talk to others. Ms. Sinclair reported that she does well at making \"acquaintances.\" Ms. Sinclair reported that she finds it hard to keep in touch with people and has only ever had around three friends at a time.    Family Psychiatric History:  Ms. Sinclair reported a family history of anxiety, bipolar disorder, and depression.     Educational/Work History:   Highest level of education obtained: Senior at [employer]   Ms. Sinclair reported that she has always done well in school. However, she has had to work harder at reading comprehension. Ms. Sinclair reported that she got along well with her teachers and other students.  Ever been active duty in the ? no  Client's occupation: Undergraduate Researcher  Ms. Sinclair reported that she has a positive job history. She noted that she gets along well with supervisors and coworkers.    Mental/Behavioral Health History:  Past diagnoses: JOVANNY  Are there any significant health issues (current or past): Denied  History of seizures: no    History of Substance Use:  Client History:  Denied  Family History: Ms. Sinclair reported that her father struggles with alcohol use.     Lifestyle:  Current hobbies include: Ms. Sinclair reported that she enjoys going to the gym, biking, watching TV, and working.     Significant Life Events:   Verbal, physical, sexual abuse? no  Has client experienced a death / loss of relationship? no  Has client experienced a major accident or tragic events? no    Legal History:  The patient has no significant history of legal issues. The patient has no current pending legal charges. The patient has no history of significant " violence.    Social History:   Social History     Socioeconomic History    Marital status: Single   Tobacco Use    Smoking status: Never    Smokeless tobacco: Never   Vaping Use    Vaping status: Never Used   Substance and Sexual Activity    Alcohol use: Never    Drug use: Never    Sexual activity: Not Currently     Partners: Male     Birth control/protection: Condom      Past Medical History:   Past Medical History:   Diagnosis Date    Anxiety     Body piercing 2021    new nose piercing.. can't remove     COVID-19 virus infection 2020    Left knee injury     playing soccer     Left knee pain     Migraines     Tear of meniscus of knee      Past Surgical History:   Past Surgical History:   Procedure Laterality Date    KNEE ACL RECONSTRUCTION Left 12/21/2021    Procedure: LEFT KNEE ARTHROSCOPY, PARTIAL MEDIAL AND LATERAL MENISCECTOMY, ANTERIOR CRUCIATE LIGAMENT RECONSTRUCTION WITH AUTOGRAFT  w q  12/21;  Surgeon: Neelam Ying MD;  Location: [facility];  Service: Orthopedics;  Laterality: Left;    KNEE ARTHROSCOPY Left 10/29/2024    Procedure: KNEE ARTHROSCOPY WITH PARTIAL MEDIAL AND LATERAL MENISCETOMY;  Surgeon: Neelam Ying MD;  Location: [facility];  Service: Orthopedics;  Laterality: Left;    LEG SURGERY      age 3     Family History:   Family History   Problem Relation Age of Onset    No Known Problems Mother     Hypertension Father     Malig Hyperthermia Neg Hx      Medications:     Current Outpatient Medications:     HYDROcodone-acetaminophen (NORCO) 5-325 MG per tablet, Take 1 tablet by mouth Every 4 (Four) Hours As Needed for Severe Pain. (Patient not taking: Reported on 11/14/2024), Disp: 12 tablet, Rfl: 0    Allergies:   No Known Allergies    Quality Measures:     TOBACCO USE:  Tobacco Use: Low Risk  (5/19/2025)    Patient History     Smoking Tobacco Use: Never     Smokeless Tobacco Use: Never     Passive Exposure: Not on file      Never smoker    I advised Albertina of the risks of tobacco  use.     Assessment / Plan / Testing Results      Visit Diagnosis/Orders Placed This Visit:    ICD-10-CM ICD-9-CM   1. Generalized anxiety disorder  F41.1 300.02        The clinician met with patient in office today. The clinician conducted a brief check in with patient regarding mood, recent psychosocial events, and stressors.  The clinician continuously assessed for environmental and psychological safety. Today session focused on discussing the patient's previous and current symptoms. The patient was referred for IQ, achievement, and ADHD assessment. The clinician used motivational interview and reflective listening throughout session today.    Allowed client to freely discuss issues  without interruption or judgement with unconditional positive regard, active listening skills, and empathy. Clinician provided a safe, confidential environment to facilitate the development of a positive therapeutic relationship and encouraged open, honest communication. Assisted client in identifying risk factors which would indicate the need for higher level of care including thoughts to harm self or others and/or self-harming behavior and encouraged client to contact this office, call 911, or present to the nearest emergency room should any of these events occur. Discussed crisis intervention services and means to access. Assisted client in processing session content; acknowledged and normalized client’s thoughts, feelings, and concerns by utilizing a person-centered approach in efforts to build appropriate rapport and a positive therapeutic relationship with open and honest communication.     Ms. Sinclair responded positively as evidenced by her active engagement and participation.    Diagnosis:  The patient reported a history of significant anxiety. Therefore, the patient is receiving a diagnosis of Generalized Anxiety Disorder. The patient's diagnoses will be updated following the results of the psychosocial assessment.    Follow  Up:   Return in about 4 weeks (around 6/13/2025) for Testing.    Abi Herrera PsyD, [facility]     Patient or patient representative verbalized consent for the use of Ambient Listening during the visit with  Abi Herrera PsyD for chart documentation. 5/19/2025  15:24 EDT  This dictation was prepared using Dragon Medical voice recognition software. As a result, errors may occur. When identified, these errors have been corrected. While every attempt is made to correct errors during dictation, errors may still exist.

## 2025-05-19 ENCOUNTER — CLINICAL SUPPORT (OUTPATIENT)
Age: 20
End: 2025-05-19
Payer: COMMERCIAL

## 2025-05-19 ENCOUNTER — OFFICE VISIT (OUTPATIENT)
Age: 20
End: 2025-05-19
Payer: COMMERCIAL

## 2025-05-19 DIAGNOSIS — F41.1 GENERALIZED ANXIETY DISORDER: Primary | ICD-10-CM

## 2025-05-19 NOTE — PROGRESS NOTES
1 of 2     Psychological Assessment Note      Date:2025   Client Name: Albertina Sinclair  : 2005   MRN: [id number]   Assessment Administration: Time In: 12:33 PM    Time Out: 4:20 PM     Referring Provider:   Patient Care Team:  Hayley Horvath PA-C as PCP - General (Family Medicine)  Fausto Moore MD (Pediatrics)  Abi Herrera PsyD as Psychologist (Behavioral Health)     Chief Complaint:      ICD-10-CM ICD-9-CM   1. Generalized anxiety disorder  F41.1 300.02        History of Present Illness:   Albertina Sinclair is a 20 y.o. female who presents for assessment related to Specific Learning Disorder (SLD) and Attention Deficit/Hyperactivity Disorder.    Objective     Mental Status Exam:   MENTAL STATUS EXAM   General Appearance:  Cleanly groomed and dressed and well developed  Eye Contact:  Good eye contact  Attitude:  Cooperative, polite and candid  Motor Activity:  Normal gait, posture and fidgety  Muscle Strength:  Normal  Speech:  Normal rate, tone, volume  Language:  Spontaneous  Mood and affect:  Normal, pleasant  Thought Process:  Logical and goal-directed  Associations/ Thought Content:  No delusions  Hallucinations:  None  Suicidal Ideations:  Not present  Homicidal Ideation:  Not present  Sensorium:  Alert and clear  Orientation:  Person, place, time and situation  Immediate Recall, Recent, and Remote Memory:  Intact  Attention Span/ Concentration:  Good  Fund of Knowledge:  Appropriate for age and educational level  Intellectual Functioning:  Above average  Insight:  Fair  Judgement:  Good  Reliability:  Good  Impulse Control:  Fair     SUICIDE RISK ASSESSMENT/CSSRS:  1. Does client have thoughts of suicide? no  2. Does client have intent for suicide? no  3. Does client have a current plan for suicide? no  4. History of suicide attempts: no  5. Family history of suicide or attempts: no  6. History of violent behaviors towards others or property or thoughts of committing suicide:  no  7. History of sexual aggression toward others: no  8. Access to firearms or weapons: no    Subjective   Client's Support Network Includes:  two parent,  family, friends, and patient siblings, friends, and roommates    Functional Status: Mild impairment      Progress toward goal: At goal     Prognosis: Good with Ongoing Treatment      Social History:   Social History     Socioeconomic History    Marital status: Single   Tobacco Use    Smoking status: Never    Smokeless tobacco: Never   Vaping Use    Vaping status: Never Used   Substance and Sexual Activity    Alcohol use: Never    Drug use: Never    Sexual activity: Yes     Partners: Male     Birth control/protection: Condom      Past Medical History:   Past Medical History:   Diagnosis Date    Ankle sprain     Anxiety     Body piercing 2021    new nose piercing.. can't remove     COVID-19 virus infection 2020    Fracture, foot     Fracture, tibia and fibula     Left knee injury     playing soccer     Left knee pain     Lumbosacral disc disease     Migraines     Tear of meniscus of knee      Past Surgical History:   Past Surgical History:   Procedure Laterality Date    KNEE ACL RECONSTRUCTION Left 12/21/2021    Procedure: LEFT KNEE ARTHROSCOPY, PARTIAL MEDIAL AND LATERAL MENISCECTOMY, ANTERIOR CRUCIATE LIGAMENT RECONSTRUCTION WITH AUTOGRAFT  w [facility] [place]  12/21;  Surgeon: Neelam Ying MD;  Location: [facility];  Service: Orthopedics;  Laterality: Left;    KNEE ARTHROSCOPY Left 10/29/2024    Procedure: KNEE ARTHROSCOPY WITH PARTIAL MEDIAL AND LATERAL MENISCETOMY;  Surgeon: Neelam Ying MD;  Location: [facility];  Service: Orthopedics;  Laterality: Left;    LEG SURGERY      age 3     Family History:   Family History   Problem Relation Age of Onset    Anxiety disorder Mother     Asthma Mother     Mental illness Mother     Hypertension Father     Alcohol abuse Father     Anxiety disorder Father     Mental illness Father     Anxiety disorder Sister      Depression Sister     Mental illness Sister     Anxiety disorder Sister     Malig Hyperthermia Neg Hx      Medications:     Current Outpatient Medications:     FLUoxetine (PROzac) 10 MG capsule, Take 1 capsule by mouth Daily., Disp: 30 capsule, Rfl: 3    spironolactone (Aldactone) 50 MG tablet, Take 1 tablet by mouth Daily., Disp: 30 tablet, Rfl: 1    Allergies:   No Known Allergies    Quality Measures:     TOBACCO USE:  Tobacco Use: Low Risk  (7/28/2025)    Patient History     Smoking Tobacco Use: Never     Smokeless Tobacco Use: Never     Passive Exposure: Not on file      Never smoker    I advised Albertina of the risks of tobacco use.     Assessment / Plan / Testing Results      Visit Diagnosis/Orders Placed This Visit:    ICD-10-CM ICD-9-CM   1. Generalized anxiety disorder  F41.1 300.02        Progress Note:  The clinician met with patient in office today. The clinician conducted a brief check in with patient regarding mood, recent psychosocial events, and stressors.  The clinician continuously assessed for environmental and psychological safety. Today session focused on completing the WAIS, ABAS 3, SCAARED, CAARS-2, CPT 3, and BASC 3. The clinician used motivational interview and reflective listening throughout session today.    Allowed client to freely discuss issues  without interruption or judgement with unconditional positive regard, active listening skills, and empathy. Clinician provided a safe, confidential environment to facilitate the development of a positive therapeutic relationship and encouraged open, honest communication. Assisted client in identifying risk factors which would indicate the need for higher level of care including thoughts to harm self or others and/or self-harming behavior and encouraged client to contact this office, call 911, or present to the nearest emergency room should any of these events occur. Discussed crisis intervention services and means to access. Assisted client in  processing session content; acknowledged and normalized client’s thoughts, feelings, and concerns by utilizing a person-centered approach in efforts to build appropriate rapport and a positive therapeutic relationship with open and honest communication.     Patient Response:  Ms. Sinclair responded positively as evidenced by her active engagement and participation.      Follow Up:   Return in about 1 week (around 5/26/2025) for Testing.    Abi Herrera PsyD, [facility] [address]

## 2025-05-30 ENCOUNTER — OFFICE VISIT (OUTPATIENT)
Age: 20
End: 2025-05-30
Payer: COMMERCIAL

## 2025-05-30 DIAGNOSIS — F81.0 SPECIFIC LEARNING DISORDER, WITH IMPAIRMENT IN READING, MILD: ICD-10-CM

## 2025-05-30 DIAGNOSIS — F81.81 SPECIFIC LEARNING DISORDER, WITH IMPAIRMENT IN WRITTEN EXPRESSION, MILD: ICD-10-CM

## 2025-05-30 DIAGNOSIS — F81.2 SPECIFIC LEARNING DISORDER, WITH IMPAIRMENT IN MATHEMATICS, MILD: ICD-10-CM

## 2025-05-30 DIAGNOSIS — F41.1 GENERALIZED ANXIETY DISORDER: ICD-10-CM

## 2025-05-30 DIAGNOSIS — F90.2 ATTENTION DEFICIT HYPERACTIVITY DISORDER, COMBINED TYPE: Primary | ICD-10-CM

## 2025-06-24 ENCOUNTER — OFFICE VISIT (OUTPATIENT)
Dept: FAMILY MEDICINE CLINIC | Facility: CLINIC | Age: 20
End: 2025-06-24
Payer: COMMERCIAL

## 2025-06-24 VITALS
WEIGHT: 149.6 LBS | HEIGHT: 59 IN | HEART RATE: 73 BPM | BODY MASS INDEX: 30.16 KG/M2 | DIASTOLIC BLOOD PRESSURE: 70 MMHG | SYSTOLIC BLOOD PRESSURE: 111 MMHG | OXYGEN SATURATION: 97 %

## 2025-06-24 DIAGNOSIS — L65.9 HAIR LOSS: Primary | ICD-10-CM

## 2025-06-24 DIAGNOSIS — L70.0 ACNE VULGARIS: ICD-10-CM

## 2025-06-24 DIAGNOSIS — F41.9 ANXIETY: ICD-10-CM

## 2025-06-24 RX ORDER — FLUOXETINE 10 MG/1
10 CAPSULE ORAL DAILY
Qty: 30 CAPSULE | Refills: 3 | Status: SHIPPED | OUTPATIENT
Start: 2025-06-24

## 2025-06-24 RX ORDER — SPIRONOLACTONE 50 MG/1
50 TABLET, FILM COATED ORAL DAILY
Qty: 30 TABLET | Refills: 1 | Status: SHIPPED | OUTPATIENT
Start: 2025-06-24

## 2025-06-24 NOTE — ASSESSMENT & PLAN NOTE
Treat anxiety with fluoxetine.     Start SSRI.  Advised may take 4-6 weeks to notice an effect.  Discussed potential side effects including headache, dizziness, GI symptoms, sexual side effects,  worsening mood or suicidal ideations.  Patient advised to call the office if develops any side effects or has questions. Reassess in one month. Safety planning discussed with patient, including information on [employer] hotline.     Understand with ADHD diagnosis, attention deficit will not be completely addressed by SSRI therapy, but will likely improve. Will reach out to behavioral health to discuss next steps in treatment plan.     Orders:    FLUoxetine (PROzac) 10 MG capsule; Take 1 capsule by mouth Daily.

## 2025-06-24 NOTE — ASSESSMENT & PLAN NOTE
Hair loss appears to be diffuse without specific areas of alopecia. Will evaluate TSH and iron levels. Agree that hair thinning can also be due to anxiety and stress. Will treat anxiety and stress today. Treat thinnign hair with spironolactone for dual benefit of acne treatment.     Orders:    CBC w AUTO Differential; Future    Comprehensive metabolic panel; Future    TSH; Future    spironolactone (Aldactone) 50 MG tablet; Take 1 tablet by mouth Daily.    Iron and TIBC; Future

## 2025-06-24 NOTE — PROGRESS NOTES
Office Note     Name: Albertina Sinclair    : 2005     MRN: [id number]     Chief Complaint  Labs Only (Would like to get lab work done)    Subjective     History of Present Illness:  Albertina Sinclair is a 20 y.o. female who presents today for follow up of hair loss and anxiety.     Patient was last seen in clinic on 10/21/2024. At that clinic, chief complaint was concern for learning disability. She has undergone testing with Dr. Abi Herrera. She states she was told that she has diagnosis of ADHD, but has not been started on ADHD treatment.     She states that her spring semester of school was very difficult, and she is eager to start treatment for ADHD before . She states that she has started to notice hair thinning overall since the beginning of the . She also complains of worsening acne.       Review of Systems:   Review of Systems   Constitutional:  Negative for chills, fatigue and fever.   HENT:  Negative for congestion, ear pain, rhinorrhea, sinus pain and sore throat.    Respiratory:  Negative for cough, shortness of breath and wheezing.    Cardiovascular:  Negative for chest pain.   Gastrointestinal:  Negative for abdominal pain, constipation, diarrhea, nausea and vomiting.   Endocrine:        Hair thinning   Musculoskeletal:  Negative for myalgias.   Skin:         Acne   Neurological:  Negative for dizziness and headaches.   Psychiatric/Behavioral:  Positive for decreased concentration. The patient is nervous/anxious.         Past Medical History:   Past Medical History:   Diagnosis Date    Ankle sprain     Anxiety     Body piercing     new nose piercing.. can't remove     COVID-19 virus infection     Fracture, foot     Fracture, tibia and fibula     Left knee injury     playing soccer     Left knee pain     Lumbosacral disc disease     Migraines     Tear of meniscus of knee        Past Surgical History:   Past Surgical History:   Procedure Laterality Date    KNEE ACL  RECONSTRUCTION Left 12/21/2021    Procedure: LEFT KNEE ARTHROSCOPY, PARTIAL MEDIAL AND LATERAL MENISCECTOMY, ANTERIOR CRUCIATE LIGAMENT RECONSTRUCTION WITH AUTOGRAFT  w [facility]  12/21;  Surgeon: Neelam Ying MD;  Location: [facility], [place], operated by [facility];  Service: Orthopedics;  Laterality: Left;    KNEE ARTHROSCOPY Left 10/29/2024    Procedure: KNEE ARTHROSCOPY WITH PARTIAL MEDIAL AND LATERAL MENISCETOMY;  Surgeon: Neelam Ying MD;  Location: [facility], [place], operated by [facility];  Service: Orthopedics;  Laterality: Left;    LEG SURGERY      age 3       Family History:   Family History   Problem Relation Age of Onset    Anxiety disorder Mother     Asthma Mother     Mental illness Mother     Hypertension Father     Alcohol abuse Father     Anxiety disorder Father     Mental illness Father     Anxiety disorder Sister     Depression Sister     Mental illness Sister     Anxiety disorder Sister     Malig Hyperthermia Neg Hx        Social History:   Social History     Socioeconomic History    Marital status: Single   Tobacco Use    Smoking status: Never    Smokeless tobacco: Never   Vaping Use    Vaping status: Never Used   Substance and Sexual Activity    Alcohol use: Never    Drug use: Never    Sexual activity: Yes     Partners: Male     Birth control/protection: Condom       Immunizations:   Immunization History   Administered Date(s) Administered    31-influenza Vac Quardvalent Preservativ 12/28/2015    COVID-19 (PFIZER) Purple Cap Monovalent 07/09/2021, 07/30/2021    DTaP, Unspecified 2005, 2003, 2003, 12/20/2004, 08/20/2007    HPV Quadrivalent 07/10/2014    Hep A, Unspecified 08/26/2009, 07/12/2010    Hep B, Adolescent or Pediatric 2005, 2005, 2003, 12/20/2004    HiB 2005, 2003, 12/20/2004    Hpv9 07/23/2019    MMR 07/13/2004, 08/20/2007    Meningococcal Conjugate 07/10/2014    Meningococcal MCV4P (Menactra) 07/23/2019    PEDS-Pneumococcal Conjugate (PCV7) 2003, 2003, 2003, 10/11/2004  "   Pneumococcal, Unspecified 2003, 2003, 2003, 10/11/2004    Polio, Unspecified 2003, 2003, 03/22/2004, 08/20/2007    Tdap 07/10/2014, 10/21/2024    Trumenba(meningococcal B) 08/09/2021    Varicella 07/13/2004, 08/20/2007        Medications:     Current Outpatient Medications:     FLUoxetine (PROzac) 10 MG capsule, Take 1 capsule by mouth Daily., Disp: 30 capsule, Rfl: 3    HYDROcodone-acetaminophen (NORCO) 5-325 MG per tablet, Take 1 tablet by mouth Every 4 (Four) Hours As Needed for Severe Pain. (Patient not taking: Reported on 11/14/2024), Disp: 12 tablet, Rfl: 0    spironolactone (Aldactone) 50 MG tablet, Take 1 tablet by mouth Daily., Disp: 30 tablet, Rfl: 1    Allergies:   No Known Allergies    Objective     Vital Signs  /70   Pulse 73   Ht 149.9 cm (59.02\")   Wt 67.9 kg (149 lb 9.6 oz)   SpO2 97%   BMI 30.20 kg/m²   Estimated body mass index is 30.2 kg/m² as calculated from the following:    Height as of this encounter: 149.9 cm (59.02\").    Weight as of this encounter: 67.9 kg (149 lb 9.6 oz).           Physical Exam  Vitals reviewed.   Constitutional:       General: She is not in acute distress.     Appearance: Normal appearance. She is not ill-appearing.   HENT:      Head: Normocephalic and atraumatic.      Right Ear: Tympanic membrane normal.      Left Ear: Tympanic membrane normal.      Nose: Nose normal. No congestion.      Mouth/Throat:      Mouth: Mucous membranes are moist.      Pharynx: Oropharynx is clear. No oropharyngeal exudate or posterior oropharyngeal erythema.   Eyes:      Pupils: Pupils are equal, round, and reactive to light.   Cardiovascular:      Rate and Rhythm: Normal rate and regular rhythm.      Pulses: Normal pulses.      Heart sounds: Normal heart sounds. No murmur heard.  Pulmonary:      Effort: Pulmonary effort is normal. No respiratory distress.      Breath sounds: Normal breath sounds. No wheezing or rales.   Lymphadenopathy:      " Cervical: No cervical adenopathy.   Skin:     Comments: Hair thinning with visible sclap underneath without distinct areas of alopecia.    Neurological:      Mental Status: She is alert.          Procedures     Results:  No results found for this or any previous visit (from the past 24 hours).     Assessment and Plan     Assessment/Plan:  Assessment & Plan  Hair loss  Hair loss appears to be diffuse without specific areas of alopecia. Will evaluate TSH and iron levels. Agree that hair thinning can also be due to anxiety and stress. Will treat anxiety and stress today. Treat thinnign hair with spironolactone for dual benefit of acne treatment.     Orders:    CBC w AUTO Differential; Future    Comprehensive metabolic panel; Future    TSH; Future    spironolactone (Aldactone) 50 MG tablet; Take 1 tablet by mouth Daily.    Iron and TIBC; Future    Anxiety  Treat anxiety with fluoxetine.     Start SSRI.  Advised may take 4-6 weeks to notice an effect.  Discussed potential side effects including headache, dizziness, GI symptoms, sexual side effects,  worsening mood or suicidal ideations.  Patient advised to call the office if develops any side effects or has questions. Reassess in one month. Safety planning discussed with patient, including information on [employer].     Understand with ADHD diagnosis, attention deficit will not be completely addressed by SSRI therapy, but will likely improve. Will reach out to behavioral health to discuss next steps in treatment plan.     Orders:    FLUoxetine (PROzac) 10 MG capsule; Take 1 capsule by mouth Daily.    Acne vulgaris    Orders:    spironolactone (Aldactone) 50 MG tablet; Take 1 tablet by mouth Daily.        Follow Up  Return in about 6 weeks (around 8/5/2025) for Follow up.        Hayley Horvath PA-C   [facility]

## 2025-07-28 NOTE — PROGRESS NOTES
Initial Adult Testing Note      Date:2025   Client Name: Albertina Sinclair  : 2005   MRN: [id number]   Assessment Administration: Time In: 7:50 AM    Time Out: 10:00 AM  Scorin.5  Report Writing/Interpretation: 5 hours    Referring Provider:   Patient Care Team:  Hayley Horvath PA-C as PCP - General (Family Medicine)  Fausto Moore MD (Pediatrics)  Abi Herrera PsyD as Psychologist (Behavioral Health)     Chief Complaint:      ICD-10-CM ICD-9-CM   1. Attention deficit hyperactivity disorder, combined type  F90.2 314.01   2. Generalized anxiety disorder  F41.1 300.02   3. Specific learning disorder, with impairment in reading, mild  F81.0 315.00   4. Specific learning disorder, with impairment in written expression, mild  F81.81 315.2   5. Specific learning disorder, with impairment in mathematics, mild  F81.2 315.1        History of Present Illness:   Albertina Sinclair is a 19 y.o. female who presents for assessment related to Specific Learning Disorder (SLD), cognitive ability exploration, and Attention Deficit/Hyperactivity Disorder. She has been experiencing persistent difficulties with reading and comprehension since childhood, often requiring multiple readings to grasp the meaning of written words. This issue was brought to her attention by her professor last semester, who suggested the possibility of dyslexia. Her primary care physician concurred and recommended a consultation with a specialist. As an , she recently encountered significant challenges when working with large data tables, frequently misreading numbers and struggling to track the correct spots on the table. This has led to poor academic performance.     Ms. Sinclair noted that she identifies as a perfectionist.  She described her self as a very quiet child around others, even family.  She explained that she is tends to struggle in conversation if others do not carry the conversation.  She also explained  that she finds eye contact uncomfortable and struggles to understand empathy. Ms. Sinclair reported that she is always anxious about multiple things including school and work.  She explained that her social anxiety tends to worsen when she is out of school.     Ms. Sinclair was fidgety and significantly anxious throughout session. Ms. Sinclair has a long-standing history of anxiety, dating back to her childhood, characterized by constant worry and stress. She reports feeling overwhelmed and anxious, particularly in social situations, and often experiences physical symptoms such as stomach discomfort. She also reports difficulty sleeping due to her anxiety.      The patient's anxiety symptoms and academic difficulties may also be indicative of ADHD, particularly given the high levels of anxiety she experiences. Further testing is warranted to evaluate for both dyslexia and ADHD.     Past Psychiatric History:   Ms. Sinclair reported that she is engaged in outpatient therapy. She stated that this has been helpful.     Objective     Mental Status Exam:   MENTAL STATUS EXAM   General Appearance:  Cleanly groomed and dressed and well developed  Eye Contact:  Good eye contact  Attitude:  Cooperative, polite and candid  Motor Activity:  Normal gait, posture and fidgety  Muscle Strength:  Normal  Speech:  Normal rate, tone, volume  Language:  Spontaneous  Mood and affect:  Normal, pleasant  Thought Process:  Logical and goal-directed  Associations/ Thought Content:  No delusions  Hallucinations:  None  Suicidal Ideations:  Not present  Homicidal Ideation:  Not present  Sensorium:  Alert and clear  Orientation:  Person, place, time and situation  Immediate Recall, Recent, and Remote Memory:  Intact  Attention Span/ Concentration:  Good  Fund of Knowledge:  Appropriate for age and educational level  Intellectual Functioning:  Above average  Insight:  Fair  Judgement:  Good  Reliability:  Good  Impulse Control:  Fair     SUICIDE RISK  ASSESSMENT/CSSRS:  1. Does client have thoughts of suicide? no  2. Does client have intent for suicide? no  3. Does client have a current plan for suicide? no  4. History of suicide attempts: no  5. Family history of suicide or attempts: no  6. History of violent behaviors towards others or property or thoughts of committing suicide: no  7. History of sexual aggression toward others: no  8. Access to firearms or weapons: no    Subjective     PHQ-9 Depression Screening:   PHQ-9 Total Score: (Patient-Rptd) 7     JOVANNY-7  Feeling nervous, anxious or on edge: (Patient-Rptd) More than half the days  Not being able to stop or control worrying: (Patient-Rptd) Several days  Worrying too much about different things: (Patient-Rptd) Nearly every day  Trouble Relaxing: (Patient-Rptd) Several days  Being so restless that it is hard to sit still: (Patient-Rptd) Several days  Feeling afraid as if something awful might happen: (Patient-Rptd) More than half the days  Becoming easily annoyed or irritable: (Patient-Rptd) More than half the days  JOVANNY 7 Total Score: (Patient-Rptd) 12  If you checked any problems, how difficult have these problems made it for you to do your work, take care of things at home, or get along with other people: (Patient-Rptd) Somewhat difficult    Developmental History:  Ms. Sinclair reported that her mother's birth and pregnancy with her was normal. She also noted that she believes that she met her developmental milestones on time.      Interpersonal/Relational:  Marital Status: single  Ms. Sinclair denied a history of significant romantic relationships.   Support system: two parent,  family, friends, and patient siblings, friends, and roommates   Ms. Sinclair reported that she gets along well with her family including her five younger sisters.  However, Ms. Sinclair noted that she tends to struggle to get along with her mother and tends to experience resentment in that relationship.  She explained that she has  "always struggled communicate with her mother and that her mother has always had big mood swings.  She explained that her father struggles with alcohol but has always been supportive and loving.     Ms. Sinclair reported that she has a few very good friends who she gets along with well. She reported that she struggles to make friends because of her anxiety. She explained that she does not often go out of her way to talk to others. Ms. Sinclair reported that she does well at making \"acquaintances.\" Ms. Sinclair reported that she finds it hard to keep in touch with people and has only ever had around three friends at a time.     Family Psychiatric History:  Ms. Sinclair reported a family history of anxiety, bipolar disorder, and depression.      Educational/Work History:   Highest level of education obtained: Senior at [employer]   Ms. Sinclair reported that she has always done well in school. However, she has had to work harder at reading comprehension. Ms. Sinclair reported that she got along well with her teachers and other students.  Ever been active duty in the ? no  Client's occupation: Undergraduate Researcher  Ms. Sinclair reported that she has a positive job history. She noted that she gets along well with supervisors and coworkers.     Mental/Behavioral Health History:  Past diagnoses: JOVANNY  Are there any significant health issues (current or past): Denied  History of seizures: no     History of Substance Use:  Client History:  Denied  Family History: Ms. Sinclair reported that her father struggles with alcohol use.      Lifestyle:  Current hobbies include: Ms. Sinclair reported that she enjoys going to the gym, biking, watching TV, and working.      Significant Life Events:   Verbal, physical, sexual abuse? no  Has client experienced a death / loss of relationship? no  Has client experienced a major accident or tragic events? no     Legal History:  The patient has no significant history of legal issues. The " patient has no current pending legal charges. The patient has no history of significant violence.    Social History:   Social History     Socioeconomic History    Marital status: Single   Tobacco Use    Smoking status: Never    Smokeless tobacco: Never   Vaping Use    Vaping status: Never Used   Substance and Sexual Activity    Alcohol use: Never    Drug use: Never    Sexual activity: Yes     Partners: Male     Birth control/protection: Condom      Past Medical History:   Past Medical History:   Diagnosis Date    Ankle sprain     Anxiety     Body piercing 2021    new nose piercing.. can't remove     COVID-19 virus infection 2020    Fracture, foot     Fracture, tibia and fibula     Left knee injury     playing soccer     Left knee pain     Lumbosacral disc disease     Migraines     Tear of meniscus of knee      Past Surgical History:   Past Surgical History:   Procedure Laterality Date    KNEE ACL RECONSTRUCTION Left 12/21/2021    Procedure: LEFT KNEE ARTHROSCOPY, PARTIAL MEDIAL AND LATERAL MENISCECTOMY, ANTERIOR CRUCIATE LIGAMENT RECONSTRUCTION WITH AUTOGRAFT  w [facility]  12/21;  Surgeon: Neelam Ying MD;  Location: [facility];  Service: Orthopedics;  Laterality: Left;    KNEE ARTHROSCOPY Left 10/29/2024    Procedure: KNEE ARTHROSCOPY WITH PARTIAL MEDIAL AND LATERAL MENISCETOMY;  Surgeon: Neelam Ying MD;  Location: [facility];  Service: Orthopedics;  Laterality: Left;    LEG SURGERY      age 3     Family History:   Family History   Problem Relation Age of Onset    Anxiety disorder Mother     Asthma Mother     Mental illness Mother     Hypertension Father     Alcohol abuse Father     Anxiety disorder Father     Mental illness Father     Anxiety disorder Sister     Depression Sister     Mental illness Sister     Anxiety disorder Sister     Malig Hyperthermia Neg Hx      Medications:     Current Outpatient Medications:     FLUoxetine (PROzac) 10 MG capsule, Take 1 capsule by mouth Daily., Disp: 30 capsule,  Rfl: 3    spironolactone (Aldactone) 50 MG tablet, Take 1 tablet by mouth Daily., Disp: 30 tablet, Rfl: 1    Allergies:   No Known Allergies    Quality Measures:     TOBACCO USE:  Tobacco Use: Low Risk  (7/31/2025)    Patient History     Smoking Tobacco Use: Never     Smokeless Tobacco Use: Never     Passive Exposure: Not on file      Never smoker    I advised Albertina of the risks of tobacco use.     Assessment / Plan / Testing Results      Visit Diagnosis/Orders Placed This Visit:    ICD-10-CM ICD-9-CM   1. Attention deficit hyperactivity disorder, combined type  F90.2 314.01   2. Generalized anxiety disorder  F41.1 300.02   3. Specific learning disorder, with impairment in reading, mild  F81.0 315.00   4. Specific learning disorder, with impairment in written expression, mild  F81.81 315.2   5. Specific learning disorder, with impairment in mathematics, mild  F81.2 315.1        Testing Results / Assessment:  Unstructured Interview  Review of Records  Wechsler Adult Intelligence Scale, 4th Edition  Adaptive Behavior Assessment System, 3rd Edition - Self-Report  Interiano Test of Educational Achievement, 3rd Edition  Patient Health Questionnaire-9  Generalized Anxiety Disorder-7  Screen for Adult Anxiety Related Disorders  Behavior Assessment Scales for Children, 3rd Edition  Self-Report  Caregiver Report (Alessandra Peralta)  Ioana Adult ADHD Rating Scales, 2nd Edition  Self-Report  Observer Report (Quinton Stewart)  Ioana Continuous Performance Test, 3rd Edition      Measure of Intelligence  Wechsler Adult Intelligence Scale, 5th Edition  The Wechsler Adult Intelligence Scale, 5th Edition (WAIS-5) is an individually administered scale that measures cognitive functioning based on index scores from five primary areas: Verbal Comprehension, Visual Spatial, Fluid Reasoning, Working Memory, and Processing Speed.  A Full Scale IQ (FSIQ) score is obtained from seven primary subtest representing overall cognitive ability at the  time of testing. The subtests can also be utilized to calculate complementary scores including the General Ability Index (GAI) and Cognitive Proficiency Index (CPI). The GAI is based on the Verbal Comprehension, Visual Spatial, and Fluid Reasoning subtests. Conceptually, this was developed to provide an estimate of general intelligence that is less reliant on working memory and processing speed. The CPI is based on the Working Memory Index and Processing Speed Index subtests. Conceptually, this provides an estimate of the efficiently with which information is processed for learning, higher order reasoning, and problem solving.     Ms. Sinclair obtained an FSIQ of 122, which falls within the Very High range of intellectual functioning in comparison to same age peers. Ms. Sinclair obtained a GAI of 125, which falls within the Very High range of intellectual functioning in comparison to same age peers. She also obtained a CPI of 116, which falls within the Above Average range of intellectual functioning in comparison to same age peers. As seen in the table below, Ms. Sinclair obtained a VCI of 124 (Very High), a VSI of 111 (Above Average), a FRI of 127 (Very High), a WMI of 125 (Very High), and a PSI of 102 (Average).     At the index level, Ms. Sinclair demonstrated two personal weaknesses. She demonstrated a personal weakness in the Visual Spatial Index.  This index measures the ability to evaluate visual details and to understand visual-spatial reasoning, integration and synthesis of part-whole relations, attentiveness to visual detail, and visual motor integration.  Lower scores on this index may occur due to deficits in spatial processing, difficulty with visual discrimination, visual-motor integration deficits, low general reasoning ability, and/or poor visual attention. Additionally, Ms. Sinclair demonstrated a personal weakness in the Processing Speed Index. This index measures the speed and accuracy of visual identification,  decision making, decision implementation, visual scanning, visual discrimination, short-term visual memory, visual motor coordination, and concentration.  Lower scores on this index may occur due to visual discrimination problems, distractibility, slowed decision making, generally slow cognitive speed, and/or motor difficulties.    Ms. Sinclair's index scores were compared to one another to explore statistically significant differences. This pairwise comparison revealed several significant differences. Ms. Sinclair's profile indicated that her VCI was significantly higher than her VIS and PSI, that her FRI was significantly higher than her VSI and PSI, and that her WMI was significantly higher than her VSI and PSI. Ms. Guillens VCI being higher than her VSI indicates a relative strength in using verbal stimuli and problem solving compared to visual spatial problem solving.  Additionally, her FRI is also significantly higher than her VSI.  This indicates that she has a better ability to understand the relation of visual information to abstract concepts relative to the ability to use visual and spatial information for designing construction.  The combination of both Ms. Guillens VCI and FRI being higher than her PSI indicates that there may be a relative weakness in visual perceptual and spatial reasoning skills compared to abstract conceptual reasoning abilities. Ms. Guillens WMI was also significantly higher than her VSI.  This indicates that her ability to mentally manipulate information is superior to the her ability to solve complex visual spatial problems.  In this case, working memory deficits are unlikely to limit performance on these tasks. Ms. Guillens VCI was significantly higher than her PSI.  This indicates that she can solve complex problems despite having relatively lower processing speed ability.  Lastly, her WMI was significantly higher than her PSI.  This indicates that her ability to register, maintain, and  manipulate information and short-term memory is a strength relative to the speed of decision making and using this information.    It is important to note that the significant differences between her highest scores and lowest scores could be an indicator of a developmental and/or learning disorder despite the fact that these scales are still Average or better. In the context of Ms. Sinclair's abilities, these are weaknesses that could be impacting her functionality on a daily basis.     At the subtest level, Ms. Sinclair demonstrated one personal weakness and three personal strengths. She demonstrated a personal weakness in Coding. This subtest requires the individual to work within a specified time limit to copy symbols that correspond with numbers. This subtest measures processing speed, procedural and incidental learning ability, psychomotor speed, short term visual memory, attention, concentration, motivation, visual scanning ability, visual motor coordination, and cognitive flexibility. This personal weakness indicates that, in comparison to her other abilities, Ms. Sinclair struggles more with these skills than with other skills. Ms. Sinclair demonstrated personal strengths in the Vocabulary, Matrix Reasoning, and Running Digits subtests. The Vocabulary subtest requires the individual to define words that are presented visually and orally.  It is designed to measure verbal concept formation, crystallized intelligence, and lexical knowledge. The Matrix Reasoning subtest requires the individual to view an incomplete matrix or series and select the response option that completes the matrix or series.  It is designed to measure the individual's ability to use visual spatial information to identify the underlying conceptual rule that links all the stimuli, apply the underlying concept to select the correct response, induction, classification ability, simultaneous processing, part whole relations, and fluid intelligence. The  Running Digits subtest requires the individual to listen to a sequence of digits and recall a specified number of recent digits in the same order.  This subtest is designed to measure the individuals working memory components including focus of attention, central executive, working memory capacity, maintenance of information, and auditory discrimination. Ms. Sinclair's personal strengths on these subtests indicate that, in comparison to her other skills, these skills are best developed.     Ms. Sinclair's subtest scores were compared to one another to explore statistically significant differences. This pairwise comparison revealed several significant differences. Ms. Sinclair's profile indicated that her Vocabulary score was significantly higher than her Similarities score. The Vocabulary subtest was previously described. The Similarities subtest requires the individual to describe how common objects or concepts are alike or similar.  It is designed to measure verbal concept formation, associative in categorical thinking, inductive reasoning, crystallized intelligence, lexical knowledge, cognitive flexibility, auditory comprehension, long-term memory, and verbal expression. Ms. Sinclair's higher Vocabulary score indicates that her lexical knowledge is more developed relative to her abstract thinking and cognitive flexibility. Ms. Sinclair's profile indicates that her Matrix Reasoning score was significantly higher than her Figure Weights score. The Matrix Reasoning subtest was previously described. The Figure Weights subtest requires the individual to view a scale with missing weights and select the response option that keeps the scale balanced within a specified time limit.  It is designed to measure the examinee's ability to apply the quantitative concept of the quality, understand relations among objects, quantitative reasoning, induction, fluid intelligence, simultaneous processing, and (possibly) processing speed along with  applying the concepts of matching, addition, and/or multiplication to identify the correct response. Ms. Sinclair's higher Matrix Reasoning score indicates that she has a relative strength in inductive reasoning compared to quantitative reasoning. Ms. Sinclair's profile indicated that her Running Digits score was significantly higher than her Digit Sequencing score. The Running Digits subtest was previously described. The Digit Sequencing subtest requires the individual to listen to a sequence of digits and recall of the digits and ascending order.  It is designed to measure working memory capacity, sequential processing, mental manipulation, registration of information, brief focused attention, auditory rehearsal, and auditory discrimination. Ms. Sinclair's higher Running Digit score indicates that she has stronger maintenance, repression, and/or updating skills relative to mental manipulation of material.  Additionally, it may also indicate a relatively stronger ability to register auditory stimuli when presented at a quicker pace more similar to the speed of speech as opposed to a slower pace.    Ms. Sinclair's obtained scores can be seen in the tables below.           Adaptive Behavior Measure  Adaptive Behavior Assessment System, 3rd Edition   The Adaptive Behavior Assessment System, 3rd Edition (ABAS-3) is a comprehensive measure of daily living skills. It provides scaled scores on nine Adaptive Skill Areas; these scaled scores are used to calculate a General Adaptive Composite (GAC) and Adaptive Domain Scores (Conceptual, Social, and Practical domains). Ms. Sinclair completed the self-report form.     Ms. Sinclair's ratings of her adaptive skills were generally in the Above Average range. However, there was some variability with some subtests being rated as Below Average and others as Above Average. Ms. Sinclair's ratings produced a GAC of 111 (Above Average), a Conceptual domain score of 105 (Average), a Social domain score of  116 (Above Average), and a Practical domain score of 109 (Average). These scores indicate that Ms. Sinclair is likely able to perform activities of daily living at the same level as same age peers. Her profile resulted in two personal weaknesses and two personal strengths. Ms. Sinclair ratings resulted in personal weaknesses in Communication and Community Use. This indicates that, in comparison to her other skills, Ms. Sinclair struggles more with speech, language, listening skills, vocabulary, responding to questions, and conversation skills, nonverbal communication, expressing interest in activities outside the home, getting around in the community, and recognizing different facilities. Ms. Sinclair demonstrated personal strengths in Functional Academics and Self-Direction. This indicates that, in comparison to her other skills, Ms. Sinclair is best able to utilize mathematics, reading, writing, and other academic skills to function independently along with utilizing personal responsibility, independence, self-control, completing tasks, making choices, following directions, is sticking to her daily routine.    Ms. Sinclair's ratings of herself can be seen in the table below.           Measure of Achievement  Interiano Test of Educational Achievement, 3rd Edition   The Interiano Test of Education Achievement, 3rd Edition (KTEA-3) is an individually administered, norm-referenced test of academic achievement.  Scores on the KTEA-3 are reported as standard scores with a mean of 100 and a standard deviation of 15, therefore, scores from  are considered to be in the average range. The KTEA-3 is designed for individuals between the ages of 4 years, 6 months and 25 years, 11 months.     Academic Skills Battery:  The Academic Skills Battery (ASB) composite is a measure of overall academic achievement in math, reading, and written language.  This composite score contains two subtests from each core academic area for grades 1 -12+.  It is  comprised of the Math Concepts and Applications, Letter and Word Recognition, Written Expression, Math Computation, and Reading Comprehension subtests. On the ASB, Ms. Sinclair scored in the Above Average range with a standard score of 116. This indicates that she is more adept in general academics than others of the same age.     Reading:  The Reading Composite score is comprised of two subtests: Letter and Word Recognition and Reading Comprehension. Ms. Sinclair obtained a standard score of 109 which falls within the Average range compared to same age peers. The Letter and Word Recognition subtest requires the examinee to identify letters and pronounce words of gradually increasing difficulty. Most words are irregular to ensure that the subtest measures word recognition more than decoding ability. Ms. Sinclair obtained a standard score of 113 which falls within the Above Average range compared to same age peers. The Reading Comprehension subtest requires the examinee to read passages of increasing difficulty and answer literal and inferential questions about them. Ms. Sinclair obtained a standard score of 103 which falls within the Average range compared to same age peers.     Reading Fluency:  The Reading Fluency Composite score is comprised of the Silent Reading Fluency, Word Recognition Fluency, and Decoding Fluency subtests. Ms. Sinclair obtained a standard score of 99 which falls within the Average range compared to same age peers. The Silent Reading Fluency subtest requires the examinee to silently read simple sentences and indicate whether the statement is true or false for two minutes. Ms. Sinclair obtained a standard score of 90 which falls within the Average range compared to same age peers. The Word Recognition Fluency subtest requires the examinee to read a list of words aloud as quickly as possible in two 15 second trials. Ms. Sinclair standard score of 97 which falls within the Average range compared to same age peers.  The Decoding Fluency subtest requires the examinee to read a list of nonsense words allowed as quickly as possible during two 15 second trials. Ms. Sinclair obtained a standard score of 109 which falls within the Average range compared to same age peers.    Reading Understanding:  The Reading Understanding Composite score is comprised of the Reading Comprehension and Reading Vocabulary subtests. Ms. Sinclair obtained a standard score of 111 which falls within the Above Average range compared to same age peers. The Reading Comprehension subtest was previously described and Ms. Sinclair obtained a score of 103 (Average). The Reading Vocabulary subtest requires the examinee to read sentences and select words that have similar meanings. Ms. Sinclair obtained a standard score of 118 which falls within the Above Average range compared to same age peers.    Written Language:  The Written Language Composite consists of the Written Expression and Spelling subtests. Ms. Sinclair obtained a standard score of 118 which falls within the Above Average range compared to same age peers. The Written Expression subtest requires examinees to complete writing tasks in the context of an age appropriate storybook format. Tasks include writing sentences from dictation, adding punctuation and capitalization, and filling in missing words, completing sentences, combining sentences, writing compound and complex sentences and writing an essay based on the story the examinee helped complete. Ms. Sinclair obtained a score of 127 which falls within the High range compared to same age peers. The Spelling subtest requires the examinee to write out dictated words, much like a traditional spelling test a student would take in school. Ms. Sinclair obtained a score of 106 which falls within the Average range compared to same age peers.    Mathematics:  The Math Composite consists of the Math Concepts and Applications and Math Computation subtests. Ms. Sinclair obtained a  standard score of 115 which falls within the Above Average range compared to same age peers. The Math Concepts and Applications subtest requires the examinee to respond orally to test items that focus on the application of mathematical principles to real life situations.? Skill categories include number concepts, operation concepts, time and money, measurement, geometry, data investigation, and higher math concepts. Ms. Sinclair obtained a standard score of 124 which falls within the High range compared to same age peers. The Math Computation subtest requires the examinee to solve math problems using paper and pencil.? Skills assessed include addition and subtraction and for older students, multiplication, and division operations, fractions, and decimals. Ms. Sinclair obtained a standard score of 105 which falls within the Average range compared to same age peers.    Significant Differences  Ms. Sinclair's GAI was utilized to compare to her achievement scores. This was chosen due to her significant symptoms of inattention and hyperactivity/impulsivity. These symptoms often cause significant issues with processing speeds and working memory. Therefore, the GAI is likely the most accurate representation of her intellectual abilities. It is important to note that the WAIS 5 was not available for use in comparison and the examiner utilized the WISC V GAI, instead. However, due to this issue, the examiner also utilized three methods of defining significance (difference from ASB, simple difference from IQ, and achieved vs. Projected scores) for subtest scores and two methods of defining significance for composite scale scores (simple difference from IQ and achieved vs. Projected scores). The examiner will only interpret significant differences that appear across the different methods. Based on this, Ms. Sinclair demonstrated significant differences in her achievement and ability on the Reading, Reading Fluency, and Reading  "Understanding composites. Additionally, she demonstrated significant differences in her achievement and ability in Math Computation, Silent Reading Fluency, Reading Comprehension, Spelling, and Word Recognition Fluency. This indicates that possible diagnoses should include Specific Learning Disorder, With impairment in reading, word reading accuracy, reading rate or fluency, and reading comprehension; Specific Learning Disorder, With impairment in written expression, Spelling accuracy; and Specific Learning Disorder, With impairment in mathematics, accurate or fluent calculation.     It is important to note that although Ms. Sinclair's weaknesses are in the Average range, they are significantly impaired when compared to her ability (IQ).     Ms. Sinclair's obtained scores and comparisons can be seen in the tables below.     KTEA-3 Composites and Subtests Scores                    General Behavior and Personality Measures  Patient Health Questionnaire-9   The Patient Health Questionnaire-9 (PHQ-9) is an individually administered screener for depressive symptoms based on client self-report for individuals 12 years of age and older. The questionnaire covers the criteria for Major Depressive Disorder and results in a numerical score which can be interpreted as severity of symptomology over the previous two weeks. It includes a four-point Likert scale rating from “Not at all” to “Nearly every day.” The results are based on the patient's reports and should be interpreted as the patient’s experience of their symptoms.     Ms. Sinclair's responses yielded a score of 7. She endorsed experiencing anhedonia, feeling down or depressed, sleep disturbance, appetite disturbance, and feeling bad about herself \"Several days.\" She also endorsed difficulties concentrating \"More than half the days.\" This score indicates \"Mild \" depressive symptoms.       Generalized Anxiety Disorder-7  The General Anxiety Disorder-7 (JOVANNY-7) is an individually " "administered screener for anxious symptoms based on client self-report. The questionnaire covers the criteria for Generalized Anxiety Disorder and results in a numerical score which can be interpreted as severity of symptomology over the previous two weeks. It includes a four-point Likert scale rating from “Not at all” to “Nearly every day.” The results are based on the patient’s reports and should be interpreted as the patient’s experience of their symptoms.     Ms. Sinclair's responses yielded a score of 12. She endorsed experiencing struggling to stop or control worrying, difficulties relaxing, and restlessness \"Several days.\" She endorsed feeling nervous or anxious, irritability, and feeling afraid as if something awful might happen \"Over half the days.\" Lastly, she endorsed worrying too much about different things \"Nearly every day.\" This score indicates \"Moderate\" anxious symptoms.       Screen for Adult Anxiety Related Disorders   The Screen for Adult Anxiety Related Disorders (SCAARED) is an adult anxiety measure which was adapted from the Screen for Child Anxiety Related Disorders (SCARED). The SCAARED is a self-report questionnaire which includes 44 items.  This assessment is designed to assess for factors of anxiety including somatic/panic/agoraphobia, social anxiety, generalized anxiety, and separation anxiety.  Each question and the measure assesses the frequency or intensity of a variety of symptoms or behaviors related to anxiety.    Ms. Sinclair's report resulted in a total of 62. Scores higher than 23 may indicate the presence of an anxiety disorder.  Specifically, she presented with elevated scores in the Panic Disorder or Significant Somatic Symptoms, Generalized Anxiety Disorder, Separation Anxiety Disorder, and Social Phobia Disorder domains. This indicates that Ms. Sinclair likely struggles with general anxiety, panic symptoms, is prone to having somatic symptoms related to anxiety, experiences " significant anxiety in social situations, and experiences significant anxiety related to  from primary attachment figures. This also suggests that diagnostic considerations should include Generalized Anxiety Disorder, With panic attacks; Separation Anxiety Disorder; and/or Social Anxiety Disorder.       Behavior Assessment System for Children, 3rd Edition  The Behavior Assessment System for Children, 3rd Edition (BASC-3) is a comprehensive measure of adaptive and problem behaviors for  to young adults. The BASC-3 contains five validity indices which measure the 's consistency, positive and negative impression management, and other aspects of the manner in which the  responded to questions. The V Index measures nonsensical or improbable items that tend to only be marked by examinees due to carelessness, misunderstanding questions, or failure to cooperate. The Consistency Index identifies situations in which the examinee has given inconsistent responses. The F Index is an infrequency scale that assesses the possibility that a  responded in an inordinately negative fashion. Lastly, the self-report contains the L Index which detects an overly positive response style (i.e., “faking good”).     Ms. Sinclair and her mother, Alessandra Peralta, completed the self-report and caregiver report.     Self-Report  Ms. Sinclair's validity indices were in the acceptable range. Therefore, these results will be considered and interpreted.     Ms. Sinclair rated herself as at risk on the Anxiety and Depression scales. This indicates that she is experiencing substantial worry, nervousness, and inability to relax, feeling sad, being misunderstood, and/or feeling that life is getting worse and worse.  Additionally, she rated herself as clinically significant on the Social Stress scale.  This indicates that she reports having difficulty establishing and maintaining close relationships with others and reports  being isolated and lonely.  Individuals that are experiencing significant internalizing problems often isolate themselves from others.  Conversely, individuals that feel isolated from others often experience significant internalizing symptoms such as anxiety and depression.    Ms. Sinclair rated herself as at risk on the Attention Problems scale.  This indicates that she endorses having difficulty maintaining necessary levels of attention.  These problems may disrupt academic performance and functioning in other areas.  Additionally, her score on the Hyperactivity scale was average.  Therefore, this indicates that diagnostic considerations should include ADHD, predominantly inattentive presentation.    Ms. Sinclair's report resulted in several elevations on the adaptive skill scales, as well. She rated herself as at risk on the Relations with Parents and Self-Esteem scales.  This indicates that she is experiencing a strained relationship with her parents.  She also indicates that she may have little trust in her parents and may feel incidental to family life and decision making.  Further, these elevations indicate that she reports a lower self image than others of the same age. Ms. Sinclair rated herself as clinically significant on the Interpersonal Relations scale. This indicates that she is having substantial difficulties establishing and maintaining relationships with others.     Ms. Sinclair rated herself as at risk on the Test Anxiety and Resiliency content scales. This indicates that she experiences test related anxiety before and during testing sessions and that she is dissatisfied with herself and her abilities.     Ms. Sinclair's ratings of herself can be seen in the tables below.     Self Report Clinical and Adaptive Scales   Index/Subtest  T-Score Percentile Rank  Classification    Internalizing Problems  60 85 At Risk        Atypically 51 65 Average       Locus of Control 55 75 Average       Social Stress  70 95  Clinically Significant       Anxiety  66 91 At Risk        Depression  62 88 At Risk        Sense of Inadequacy  49 56 Average       Somatization  57 82 Average   Inattention/Hyperactivity  55 73 Average       Attention Problem  61 85 At Risk        Hyperactivity  48 50 Average   Emotional Symptoms Index 61 86 At Risk       Sensation Seeking 40 16 Average      Alcohol Abuse 48 57 Average   Personal Adjustment  36 10 At Risk        Relations with Parents  37 12 At Risk        Interpersonal Relations 25 2 Clinically Significant       Self-Esteem  39 16 At Risk        Self-Reliance  54 61 Average       School Maladjustment 42 24 Average     Clinical Scales:   <= 59: Average   60-69: At Risk   >=70: Clinically Significant  Adaptive Scales:   >=41: Average  31-40: At Risk   <= 30: Clinically Significant      BASC-3 Self Report Content Scale Scores   Index/Subtest  T-Score Percentile Rank  Classification    Test Anxiety  68 95 At Risk    Anger Control  58 81 Average   [place]  56 74 Average   Ego Strength  39 14 At Risk      Clinical Scales:   <= 59: Average   60-69: At Risk   >=70: Clinically Significant  Adaptive Scales:   >=41: Average  31-40: At Risk   <= 30: Clinically Significant      Caregiver Report (Alessandar Peralta)  Ms. Peralta's validity indices fell into the acceptable range.  Therefore, these results will be considered and interpreted.     Ms. Peralta endorsed items which resulted in an elevation on the Anxiety scale.  Individuals with elevated scores on the scale may experience nervousness and excessive worry, intrusive or obsessive thoughts, negative self appraisal, and may be easily overwhelmed.  Anxiety also cooccurs with depression and somatic complaints, and it can sometimes be a symptom of these other disorders.    Ms. Valeras report did not result in any other elevations on the BASC 3.    Ms. Peralta's ratings of Ms. Sinclair can be seen in the tables below.     BASC-3 Caregiver Report Clinical and Adaptive Scales    Index/Subtest  T-Score Percentile Rank  Classification    Externalizing Problems  40 1 Average      Hyperactivity  39 2 Average      Aggression  41 12 Average      Conduct Problems  40 6 Average   Internalizing Problems  50 58 Average      Anxiety  61 84 At Risk       Depression 45 45 Average      Somatization  43 33 Average   Behavioral Symptom Index  42 28 Average      Atypicality  42 29 Average      Withdrawal  55 70 Average      Attention Problems  35 6 Average   Adaptive Skills  62 88 Average      Adaptability  53 58 Average      Social Skills  52 52 Average      Leadership  66 97 Average      Activities of Daily Living  70 98 Average      Functional Communication  62 87 Average     Clinical Scales:   <= 59: Average   60-69: At Risk   >=70: Clinically Significant  Adaptive Scales:   >=41: Average  31-40: At Risk   <= 30: Clinically Significant      BASC-3 Caregiver Report Content Scale Scores   Index/Subtest  T-Score Percentile Rank  Classification    Anger Control  41 24 Average   Bullying  42 21 Average   Developmental Social Disorders  47 42 Average   Emotional Self-Control  48 49 Average   Executive Functioning  30 3 Average   Negative Emotionality  45 42 Average   Resiliency  62 89 Average     Clinical Scales:   <= 59: Average   60-69: At Risk   >=70: Clinically Significant  Adaptive Scales:   >=41: Average  31-40: At Risk   <= 30: Clinically Significant      BASC-3 Caregiver Report Executive Functioning Index Summary   Index  Raw Score  Classification    Overall Executive Functioning Index  6 Not Elevated      Problem Solving Index  1 Not Elevated      Attentional Control Index  1 Not Elevated      Behavioral Control Index  0 Not Elevated      Emotional Control Index  4 Not Elevated       ADHD Specific Measures  Ioana Adult ADHD Rating Scales, 2nd Edition  The Ioana Adult ADHD Rating Scales, 2nd Edition (CAARS-2) is a comprehensive assessment of symptoms and impairments associate with Attention  Deficit/Hyperactivity Disorder (ADHD) and common comorbid disorders in adults. The CAARS-2 contains 2 validity indices which measure the reporters consistency and negative impression management.  The inconsistency index identified situations in which the examinee has given inconsistent responses.  The negative impression index is an infrequency scale that assesses the possibility that a  responded in an inordinately negative fashion.     Ms. Sinclair and her friend, Quinton Stewart, completed the CAARS-2 self-report and observer report.     Self-Report   Ms. Sinclair's validity indices were in the acceptable range. Therefore, these results will be considered and interpreted.     Ms. Sinclair rated herself as Very Elevated on the Emotional Dysregulation scale. This indicates that she experiences significant difficulties with controlling emotions, such as getting easily irritated or frustrated, overreacting, and having angry outbursts.     Ms. Sinclair rated herself as Slightly Elevated on the Hyperactivity scale. This indicates that she has some difficulties with feeling restless, having difficulty sitting still, tapping hands or feet, talking too much, distracting others, and having trouble doing activities quietly.     Ms. Sinclair rated herself as Slightly Elevated on the Total ADHD Symptoms DSM symptom scale.  This scale measures DSM-5 TR symptoms of inattention and hyperactivity/impulsivity.  Further, her report resulted in a Slightly Elevated score on the ADHD Inattentive Symptoms and the ADHD Hyperactive/Impulsive Symptoms DSM symptom scales. Ms. Sinclair endorsed 10 of the 18 symptoms of ADHD. Her report is indicative of a combined presentation as evidenced by her significant elevations in both the inattention and hyperactivity/impulsivity scales. She endorsed experiencing difficulty paying attention to details, remaining focused, listening to what is said to her, sustaining focus for long periods, being easily  distracted, fidgeting, restlessness, difficulty doing things quietly, struggling to be still, and blurting out answers before questions are finished.    The CAARS-2 contains an ADHD Probability Index which is composed of the 10 items that best differentiate individuals with ADHD from those in the general population.  The probability score derived from this denotes the probability that a given score came from an individual with ADHD.  Ms. Sinclair rated herself at 80% probability which is in the High range.     Ms. Sinclair's ratings of herself can be seen in the table below.     CAARS-2 Self-Report Form Content Scales, DSM Symptom Scales, and ADHD Index  Index/Subtest T-Score Percentile Classification   Content Scales    Inattention/Executive Dysfunction 58 82nd Not Elevated   Hyperactivity  62 88th Slightly Elevated   Impulsivity  50 54th Not Elevated   Emotional Dysregulation  72 96th Very Elevated   Negative Self-Concept  56 76th Not Elevated   DSM Symptom Scales   ADHD Inattentive Symptoms 60 85th Slightly Elevated   ADHD Hyperactive/Impulsive Symptoms  64 90th Slightly Elevated   Total ADHD Symptoms  62 90th Slightly Elevated    Probability Score  Classification    CAARS-2 ADHD Index  80%  High   <= 56: Not Elevated  57-64: Slightly Elevated  65-69: Elevated  >= 70: Very Elevated     Observer Report (Quinton Stewart)  Ms. Stewart's validity indices were in the acceptable range. Therefore, these results will be considered and interpreted.     Ms. Stewart's ratings of Ms. Sinclair resulted in Elevated scores on the Emotional Dysregulation and Negative Self-Concept scales. This indicates that Ms. Sinclair likely has difficulties with controlling emotions, such as getting easily irritated or frustrated, overreacting, and having angry outbursts. Additionally, she likely has difficulties with low self-confidence, feeling like a failure, and self-criticism.     Ms. Stewart rated Ms. Sinclair as not elevated on the DSM Symptom Scales.  "However, she did endorse that Ms. Sinclair struggles with making careless mistakes.     The CAARS-2 contains an ADHD Probability Index which is composed of the 10 items that best differentiate individuals with ADHD from those in the general population. The probability score derived from this denotes the probability that a given score came from observations of an individual with ADHD. Ms. Stewart rated Ms. Sinclair at 25% probability which is in the Low range.    Ms. Stewart's ratings of Ms. Sinclair can be seen in the table below.     CAARS-2 Observer Form Content Scales, DSM Symptom Scales, and ADHD Index  Index/Subtest T-Score Percentile Classification   Content Scales    Inattention/Executive Dysfunction 48 43rd Not Elevated   Hyperactivity  43 29th Not Elevated   Impulsivity  46 41st Not Elevated   Emotional Dysregulation  66 91st Elevated   Negative Self-Concept  65 92nd Elevated   DSM Symptom Scales   ADHD Inattentive Symptoms 45 35th Not Elevated   ADHD Hyperactive/Impulsive Symptoms  45 35th Not Elevated   Total ADHD Symptoms  45 32nd Not Elevated    Probability Score  Classification    CAARS-2 ADHD Index  25%  Low   <= 56: Not Elevated  57-64: Slightly Elevated  65-69: Elevated  >= 70: Very Elevated       Ioana Continuous Performance Test, 3rd Edition  The Ioana Continuous Performance Test, Third Edition (CPT 3) is a measure of attention related problems and individuals aged 8 years and older.  During the administration, individuals are required to respond when any letter appears, except the nontarget letter \"X.\"  The CPT 3 measures and in individuals inattentiveness, impulsivity, sustained attention, and vigilance.  The CPT 3 also includes a validity check based on the number of omission errors and hits committed.  Further, the CPT 3 also includes a response style analysis.    Ms. Sinclair's validity check was in the acceptable range.  This indicates that this administration is valid and should be considered and " interpreted.    Ms. Sinclair's response style analysis indicated that she has a balanced style of responding that is sensitive to both speed and accuracy.     Ms. Sinclair's performance resulted in a total of 3 atypical T-scores, which is associated with a moderate likelihood of having a disorder characterized by attention deficits. Specifically, her profile indicated that she has some difficulties with inattentiveness and vigilance (performance on trials with longer intervals between stimuli). Relative to the normative sample, Ms. Sinclair displayed less consistency in response speed, was less able to differentiate targets from non-targets, and displayed more of a reduction in response speed at longer stimuli intervals.       Summary and Diagnostic Conclusions  Summary  Ms. Sinclair is a bright and entertaining who presented for psychosocial/educational assessment to clarify diagnoses and potential barriers to learning. Ms. Sinclair stated in an unstructured interview about her life and symptoms and was also administered several psychological assessments and screeners to clarify symptomology and potential diagnoses. Ms. Sinclair reported significant difficulties with inattention, hyperactivity, impulsivity, low self-esteem, difficulty controlling emotions, and learning difficulties.      Diagnostic Conclusions  Ms. Sinclair that she was previously diagnosed with Generalized Anxiety Disorder. Based on the results of this assessment, this diagnosis appears to be accurate.    The Diagnostic and Statistical Manual of Mental Disorders, Fifth Edition, Text Revision (DSM-5-TR) defines Attention Deficit/Hyperactivity Disorder as a persistent pattern of inattention and/or hyperactivity/impulsivity that interferes with functioning or development. Inattention may manifest behaviorally as difficulty staying on task, failing to follow through on directions, disorganization, and difficulty sustaining focus. Hyperactivity may manifest as excessive  "motor activity, excessive fidgeting, restlessness, or excessive talkativeness. Impulsivity may manifest as acting without thinking, difficulty delaying gratification, or social intrusiveness (e.g. interrupting others excessively).  Based on the results of the CPT 3, BASC 3, WAIS 5, CAARS-2, clinical interview, and clinical observation, Ms. Sinclair appears to struggle with both inattention and hyperactivity/impulsivity.  The CPT 3 indicated the presence of 3 atypical T-scores and difficulties with inattention and vigilance. Ms. Sinclair endorsed significant attention and hyperactivity/impulsivity symptoms on the BASC-3 and CAARS-2. Further, Ms. Sinclair's notably lower processing speed score on the WAIS 5 is indicative of ADHD, as well. The observer reports did not note these difficulties. However, women with high IQs, like Ms. Sinclair, are often able to compensate for symptoms of ADHD due to their high cues and tend to present as anxious instead. Further, ADHD symptoms in women are often manifested as feeling overwhelmed by simple decisions, feelings of restlessness, time management difficulties, and emotional dysregulation (which was endorsed by each observer).  Therefore, Ms. Sinclair appears to meet criteria for Attention Deficit/Hyperactivity Disorder, Combined presentation, Mild.     The Diagnostic and Statistical Manual of Mental Disorders, Fifth Edition, Text Revision (DSM-5-TR) defines a Specific Learning Disorder as persistent difficulties learning keystone academic skills, performance in those areas being well below expected achievement, and are not better explained by an intellectual developmental disorder. Specific learning disorder may also occur in individuals identified as intellectually \"gifted.\" These individuals may be able to sustain apparently adequate academic functioning by using compensatory strategies, extraordinarily high effort, or support, until the learning demands or assessment procedures pose " barriers to their demonstrating their learning or accomplishing required tasks. In these cases, the individual's achievement scores will be low relative to ability level or achievement in other domains, rather than to the population mean for achievement. Ms. Sinclair demonstrated significant differences in her ability (GAI of 125) and achievement on the Reading, Reading Fluency, and Reading Understanding composites. Additionally, she demonstrated significant differences in her achievement and ability in Math Computation, Silent Reading Fluency, Reading Comprehension, Spelling, and Word Recognition Fluency. Therefore, Ms. Sinclair appears to meet criteria for Specific Learning Disorder, With impairment in reading, word reading accuracy, reading rate or fluency, and reading comprehension; Specific Learning Disorder, With impairment in written expression, Spelling accuracy; and Specific Learning Disorder, With impairment in mathematics, accurate or fluent calculation.     It is important to note that Ms. Sinclair's negative self-evaluation, lower self-esteem, social stress, and emotional regulation difficulties should be monitored for further development. Her self-report resulted in elevated Self-Esteem and Social Stress scales on the BASC-3. This often indicates low sense of self-efficacy and could correlate with anxious or depressive symptoms. Women who are diagnosed later in life with ADHD can experience negative impacts on social emotional wellbeing, the ability to form and maintain relationships, self-esteem, and feeling a lack of control. These symptoms may improve once treatment for  Ms. Sinclair's ADHD symptoms begins.     Diagnoses:   Diagnosis Plan   1. Attention deficit hyperactivity disorder, combined type        2. Generalized anxiety disorder        3. 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 Sinclair:  Engage in individual therapy with a focus in Cognitive Behavioral Therapy and Dialectical Behavior Therapy to manage her anxious, attention, and hyperactivity/impulsivity symptoms. Additionally, she should engage with her clinician to better manage overall levels of distress, improve social communication, and emotional regulation skills.  Develop assertiveness and communication skills with her clinician in individual therapy to better manage conflict and communication.  Continue her medication regiment as prescribed and explore other possible medication options if desired.   Engage in community activities to increase her sense of well-being, socialization, and self-worth.  Develop mindfulness skills to manage attention and anxiety.  Explore study and productivity skills such as spaced repetition, mnemonics, chunking, peer instruction, the Pomodoro Technique, time blocking, visual organization, and task management skills.  Utilize noise cancelling headphones for focus.  Utilize focus timers to improve focusing skills.   Text to speech (TTS) is a technology that converts text into spoken audio. It can read aloud PDFs, websites, and books using natural AI voices. TTS technology can be helpful for anyone who needs to access written content in an auditory format, and it can provide a more inclusive and accessible way of communication for many people. https://[employer]/ and https://www.[employer]/online/   Utilizing two forms of input can increase comprehension for individuals with ADHD (I.e., listening to an audiobook while you read the same book).   Explore the implications of her diagnoses with her clinician and discuss how these diagnoses may have impacted her throughout her life.   Explore the unique experience of female individuals with ADHD with her clinician.   Explore groups and  activities through the [employer] [place] [employer] ([employer]). These group often holds different gatherings for individuals with mental illness, developmental disorders, or other difficulties in their family members. https://www.[place].org/tracks/   Be considered for school accommodations such as:  A lower distraction environment for exams  The opportunity to stand and/or pace while completing assignments  Preferential seating  The use of noise canceling headphones for concentration  The use of graphical organizers or visual aids  Assignments broken into smaller more manageable task  Allow fidget tools or stress balls at the desk  Scheduled breaks to break up work  Education on study skills and implementation of these skills   Extended time on exams and/or assignments  Permission to make audio recordings of classes or utilization of smart pens  Reduced course load  Access to audiobooks  Early access to slides/materials for class  Exam breaks  Specialized education in reading, writing, and mathematics to improve his ability to learn and understand grade level information.   Access to a scribe or [id number] technology for longer written assignments.   Oral and written instructions  Use of audiobooks  Reduced writing assignments   Providing spelling/vocabulary lists  Allowing alternative assignments such as oral reports  Read   The Power of Neurodiversity: Discovering the Extraordinary Gifts of Autism, ADHD, Dyslexia, and Other Brain Differences by Amilcar Samson PhD.     Further questions can be directed to the examiner, Abi Herrera Psy.D., \[facility]\"", through contacting the [facility] office at [facility] in [place], [place] at [phone].       The clinician met with patient in office today. The clinician conducted a brief check in with patient regarding mood, recent psychosocial events, and stressors. The clinician continuously assessed for environmental and psychological safety. Today  session focused on completing the KTEA-3. The clinician administered and interpreted the results of this assessment and previously administered assessments. The clinician then integrated the results of each of these assessments and the clinical interview and wrote a psychosocial report summarizing the results. The clinician used motivational interview, reflective listening, and elements of CBT including thought and feeling exploration, symptom exploration, psychoeducation, and processing throughout session today.     Allowed client to freely discuss issues  without interruption or judgement with unconditional positive regard, active listening skills, and empathy. Clinician provided a safe, confidential environment to facilitate the development of a positive therapeutic relationship and encouraged open, honest communication. Assisted client in identifying risk factors which would indicate the need for higher level of care including thoughts to harm self or others and/or self-harming behavior and encouraged client to contact this office, call 911, or present to the nearest emergency room should any of these events occur. Discussed crisis intervention services and means to access. Assisted client in processing session content; acknowledged and normalized client’s thoughts, feelings, and concerns by utilizing a person-centered approach in efforts to build appropriate rapport and a positive therapeutic relationship with open and honest communication.     Follow Up:   Return in about 3 months (around 8/30/2025) for Next scheduled follow up.    Abi Herrera PsyD, [facility]  [place]   This dictation was prepared using Dragon Medical voice recognition software. As a result, errors may occur. When identified, these errors have been corrected. While every attempt is made to correct errors during dictation, errors may still exist.

## 2025-08-05 ENCOUNTER — LAB (OUTPATIENT)
Dept: LAB | Facility: HOSPITAL | Age: 20
End: 2025-08-05
Payer: COMMERCIAL

## 2025-08-05 ENCOUNTER — OFFICE VISIT (OUTPATIENT)
Dept: FAMILY MEDICINE CLINIC | Facility: CLINIC | Age: 20
End: 2025-08-05
Payer: COMMERCIAL

## 2025-08-05 VITALS
WEIGHT: 143.8 LBS | BODY MASS INDEX: 28.99 KG/M2 | DIASTOLIC BLOOD PRESSURE: 83 MMHG | HEART RATE: 73 BPM | HEIGHT: 59 IN | SYSTOLIC BLOOD PRESSURE: 120 MMHG | OXYGEN SATURATION: 98 %

## 2025-08-05 DIAGNOSIS — L70.0 ACNE VULGARIS: ICD-10-CM

## 2025-08-05 DIAGNOSIS — F41.9 ANXIETY: Primary | ICD-10-CM

## 2025-08-05 DIAGNOSIS — L65.9 HAIR LOSS: ICD-10-CM

## 2025-08-05 LAB
BASOPHILS # BLD AUTO: 0.04 10*3/MM3 (ref 0–0.2)
BASOPHILS NFR BLD AUTO: 0.4 % (ref 0–1.5)
DEPRECATED RDW RBC AUTO: 44.5 FL (ref 37–54)
EOSINOPHIL # BLD AUTO: 0.07 10*3/MM3 (ref 0–0.4)
EOSINOPHIL NFR BLD AUTO: 0.8 % (ref 0.3–6.2)
ERYTHROCYTE [DISTWIDTH] IN BLOOD BY AUTOMATED COUNT: 12.4 % (ref 12.3–15.4)
HCT VFR BLD AUTO: 42.1 % (ref 34–46.6)
HGB BLD-MCNC: 13.9 G/DL (ref 12–15.9)
IMM GRANULOCYTES # BLD AUTO: 0.03 10*3/MM3 (ref 0–0.05)
IMM GRANULOCYTES NFR BLD AUTO: 0.3 % (ref 0–0.5)
LYMPHOCYTES # BLD AUTO: 2.62 10*3/MM3 (ref 0.7–3.1)
LYMPHOCYTES NFR BLD AUTO: 29.4 % (ref 19.6–45.3)
MCH RBC QN AUTO: 31.7 PG (ref 26.6–33)
MCHC RBC AUTO-ENTMCNC: 33 G/DL (ref 31.5–35.7)
MCV RBC AUTO: 96.1 FL (ref 79–97)
MONOCYTES # BLD AUTO: 0.57 10*3/MM3 (ref 0.1–0.9)
MONOCYTES NFR BLD AUTO: 6.4 % (ref 5–12)
NEUTROPHILS NFR BLD AUTO: 5.57 10*3/MM3 (ref 1.7–7)
NEUTROPHILS NFR BLD AUTO: 62.7 % (ref 42.7–76)
NRBC BLD AUTO-RTO: 0 /100 WBC (ref 0–0.2)
PLATELET # BLD AUTO: 415 10*3/MM3 (ref 140–450)
PMV BLD AUTO: 9.1 FL (ref 6–12)
RBC # BLD AUTO: 4.38 10*6/MM3 (ref 3.77–5.28)
WBC NRBC COR # BLD AUTO: 8.9 10*3/MM3 (ref 3.4–10.8)

## 2025-08-05 PROCEDURE — 1125F AMNT PAIN NOTED PAIN PRSNT: CPT

## 2025-08-05 PROCEDURE — 84466 ASSAY OF TRANSFERRIN: CPT

## 2025-08-05 PROCEDURE — 83540 ASSAY OF IRON: CPT

## 2025-08-05 PROCEDURE — 99214 OFFICE O/P EST MOD 30 MIN: CPT

## 2025-08-05 PROCEDURE — 36415 COLL VENOUS BLD VENIPUNCTURE: CPT

## 2025-08-05 PROCEDURE — 84443 ASSAY THYROID STIM HORMONE: CPT

## 2025-08-05 PROCEDURE — 80053 COMPREHEN METABOLIC PANEL: CPT

## 2025-08-05 PROCEDURE — 85025 COMPLETE CBC W/AUTO DIFF WBC: CPT

## 2025-08-05 RX ORDER — FLUOXETINE 10 MG/1
10 CAPSULE ORAL DAILY
Qty: 90 CAPSULE | Refills: 3 | Status: SHIPPED | OUTPATIENT
Start: 2025-08-05

## 2025-08-05 RX ORDER — SPIRONOLACTONE 100 MG/1
100 TABLET, FILM COATED ORAL DAILY
Qty: 30 TABLET | Refills: 2 | Status: SHIPPED | OUTPATIENT
Start: 2025-08-05

## 2025-08-06 LAB
ALBUMIN SERPL-MCNC: 4.9 G/DL (ref 3.5–5.2)
ALBUMIN/GLOB SERPL: 2 G/DL
ALP SERPL-CCNC: 52 U/L (ref 39–117)
ALT SERPL W P-5'-P-CCNC: 11 U/L (ref 1–33)
ANION GAP SERPL CALCULATED.3IONS-SCNC: 12 MMOL/L (ref 5–15)
AST SERPL-CCNC: 18 U/L (ref 1–32)
BILIRUB SERPL-MCNC: 0.3 MG/DL (ref 0–1.2)
BUN SERPL-MCNC: 11 MG/DL (ref 6–20)
BUN/CREAT SERPL: 13.1 (ref 7–25)
CALCIUM SPEC-SCNC: 9.7 MG/DL (ref 8.6–10.5)
CHLORIDE SERPL-SCNC: 102 MMOL/L (ref 98–107)
CO2 SERPL-SCNC: 23 MMOL/L (ref 22–29)
CREAT SERPL-MCNC: 0.84 MG/DL (ref 0.57–1)
EGFRCR SERPLBLD CKD-EPI 2021: 100.9 ML/MIN/1.73
GLOBULIN UR ELPH-MCNC: 2.5 GM/DL
GLUCOSE SERPL-MCNC: 83 MG/DL (ref 65–99)
IRON 24H UR-MRATE: 113 MCG/DL (ref 37–145)
IRON SATN MFR SERPL: 46 % (ref 20–50)
POTASSIUM SERPL-SCNC: 4.4 MMOL/L (ref 3.5–5.2)
PROT SERPL-MCNC: 7.4 G/DL (ref 6–8.5)
SODIUM SERPL-SCNC: 137 MMOL/L (ref 136–145)
TIBC SERPL-MCNC: 244 MCG/DL (ref 298–536)
TRANSFERRIN SERPL-MCNC: 164 MG/DL (ref 200–360)
TSH SERPL DL<=0.05 MIU/L-ACNC: 0.63 UIU/ML (ref 0.27–4.2)

## 2025-08-28 ENCOUNTER — PATIENT MESSAGE (OUTPATIENT)
Age: 20
End: 2025-08-28

## 2025-08-28 ENCOUNTER — TELEMEDICINE (OUTPATIENT)
Age: 20
End: 2025-08-28
Payer: COMMERCIAL

## 2025-08-28 DIAGNOSIS — F41.1 GENERALIZED ANXIETY DISORDER: ICD-10-CM

## 2025-08-28 DIAGNOSIS — F81.81 SPECIFIC LEARNING DISORDER, WITH IMPAIRMENT IN WRITTEN EXPRESSION, MILD: ICD-10-CM

## 2025-08-28 DIAGNOSIS — F81.0 SPECIFIC LEARNING DISORDER, WITH IMPAIRMENT IN READING, MILD: ICD-10-CM

## 2025-08-28 DIAGNOSIS — F90.2 ATTENTION DEFICIT HYPERACTIVITY DISORDER, COMBINED TYPE: Primary | ICD-10-CM

## 2025-08-28 DIAGNOSIS — F81.2 SPECIFIC LEARNING DISORDER, WITH IMPAIRMENT IN MATHEMATICS, MILD: ICD-10-CM

## (undated) DEVICE — SUT ETHLN 3/0 PS1 18IN 1663H

## (undated) DEVICE — PATIENT RETURN ELECTRODE, SINGLE-USE, CONTACT QUALITY MONITORING, ADULT, WITH 9FT CORD, FOR PATIENTS WEIGING OVER 33LBS. (15KG): Brand: MEGADYNE

## (undated) DEVICE — BLD DISSCT COOL CUT SJ CRVD 4MM 13CM

## (undated) DEVICE — TRAP FLD MINIVAC MEGADYNE 100ML

## (undated) DEVICE — UNDYED BRAIDED (POLYGLACTIN 910), SYNTHETIC ABSORBABLE SUTURE: Brand: COATED VICRYL

## (undated) DEVICE — KT ACL TRANSTIB WO/ SAWBLD

## (undated) DEVICE — TBG ARTHSCP PT W CONN/REDUC 8FT

## (undated) DEVICE — PROB ABL APOLLORF MP50 ASP 50DEG

## (undated) DEVICE — GLV SURG SIGNATURE ESSENTIAL PF LTX SZ8

## (undated) DEVICE — SKIN PREP TRAY W/CHG: Brand: MEDLINE INDUSTRIES, INC.

## (undated) DEVICE — ABL ASP APOLLO RF XL 90D

## (undated) DEVICE — DRSNG WND GZ CURAD OIL EMULSION 3X3IN STRL

## (undated) DEVICE — SUT VIC 2/0 CT2 27IN J269H

## (undated) DEVICE — BLD SHAVER BONECUTTER 5MM 13CM

## (undated) DEVICE — GLV SURG BIOGEL LTX PF 6 1/2

## (undated) DEVICE — GLV SURG BIOGEL LTX PF 8

## (undated) DEVICE — UNDERCAST PADDING: Brand: DEROYAL

## (undated) DEVICE — POOLE SUCTION HANDLE: Brand: CARDINAL HEALTH

## (undated) DEVICE — ABL APOLLO RF M/PRT 50D

## (undated) DEVICE — DRP C/ARM 41X74IN

## (undated) DEVICE — BUR RND COOL CUT 8FLUT 4MM 13CM

## (undated) DEVICE — INTENT TO BE USED WITH SUTURE MATERIAL FOR TISSUE CLOSURE: Brand: RICHARD-ALLAN® NEEDLE STRAIGHT CUTTING

## (undated) DEVICE — BNDG ELAS ELITE V/CLOSE 4IN 5YD LF STRL

## (undated) DEVICE — ACL GRAFT KNIFE 10MM

## (undated) DEVICE — 3M™ STERI-STRIP™ COMPOUND BENZOIN TINCTURE 40 BAGS/CARTON 4 CARTONS/CASE C1544: Brand: 3M™ STERI-STRIP™

## (undated) DEVICE — BNDG,ELSTC,MATRIX,STRL,4"X5YD,LF,HOOK&LP: Brand: MEDLINE

## (undated) DEVICE — TUBING, SUCTION, 1/4" X 20', STRAIGHT: Brand: MEDLINE INDUSTRIES, INC.

## (undated) DEVICE — TBG PUMP ARTHSCP MAIN AR6400 16FT

## (undated) DEVICE — REAMR LP 10MM  STRL

## (undated) DEVICE — PENCL E/S ULTRAVAC TELESCP NOSE HOLSTR 10FT

## (undated) DEVICE — DRAPE,REIN 53X77,STERILE: Brand: MEDLINE

## (undated) DEVICE — PK ACL 40